# Patient Record
Sex: FEMALE | Race: WHITE | NOT HISPANIC OR LATINO | Employment: OTHER | URBAN - METROPOLITAN AREA
[De-identification: names, ages, dates, MRNs, and addresses within clinical notes are randomized per-mention and may not be internally consistent; named-entity substitution may affect disease eponyms.]

---

## 2020-12-14 ENCOUNTER — HOSPITAL ENCOUNTER (EMERGENCY)
Facility: HOSPITAL | Age: 70
Discharge: HOME | End: 2020-12-14
Attending: SURGERY
Payer: COMMERCIAL

## 2020-12-14 VITALS
OXYGEN SATURATION: 98 % | DIASTOLIC BLOOD PRESSURE: 78 MMHG | RESPIRATION RATE: 18 BRPM | SYSTOLIC BLOOD PRESSURE: 135 MMHG | HEART RATE: 82 BPM | WEIGHT: 200 LBS | BODY MASS INDEX: 33.32 KG/M2 | TEMPERATURE: 99 F | HEIGHT: 65 IN

## 2020-12-14 DIAGNOSIS — J18.9 PNEUMONIA DUE TO INFECTIOUS ORGANISM, UNSPECIFIED LATERALITY, UNSPECIFIED PART OF LUNG: Primary | ICD-10-CM

## 2020-12-14 DIAGNOSIS — R04.2 BLOOD IN SPUTUM: ICD-10-CM

## 2020-12-14 LAB
ALBUMIN SERPL BCP-MCNC: 2.8 G/DL (ref 3.5–5.2)
ALP SERPL-CCNC: 140 U/L (ref 55–135)
ALT SERPL W/O P-5'-P-CCNC: 24 U/L (ref 10–44)
ANION GAP SERPL CALC-SCNC: 7 MMOL/L (ref 8–16)
APTT BLDCRRT: 33.9 SEC (ref 21–32)
AST SERPL-CCNC: 19 U/L (ref 10–40)
BASOPHILS # BLD AUTO: 0.12 K/UL (ref 0–0.2)
BASOPHILS # BLD AUTO: 0.13 K/UL (ref 0–0.2)
BASOPHILS NFR BLD: 1.3 % (ref 0–1.9)
BASOPHILS NFR BLD: 1.3 % (ref 0–1.9)
BILIRUB SERPL-MCNC: 0.2 MG/DL (ref 0.1–1)
BNP SERPL-MCNC: <10 PG/ML (ref 0–99)
BUN SERPL-MCNC: 31 MG/DL (ref 8–23)
CALCIUM SERPL-MCNC: 9.4 MG/DL (ref 8.7–10.5)
CHLORIDE SERPL-SCNC: 102 MMOL/L (ref 95–110)
CK MB SERPL-MCNC: 3.6 NG/ML (ref 0.1–6.5)
CK MB SERPL-MCNC: 3.7 NG/ML (ref 0.1–6.5)
CK MB SERPL-RTO: 4.7 % (ref 0–5)
CK MB SERPL-RTO: 4.9 % (ref 0–5)
CK SERPL-CCNC: 73 U/L (ref 20–180)
CK SERPL-CCNC: 73 U/L (ref 20–180)
CK SERPL-CCNC: 79 U/L (ref 20–180)
CK SERPL-CCNC: 79 U/L (ref 20–180)
CO2 SERPL-SCNC: 27 MMOL/L (ref 23–29)
CREAT SERPL-MCNC: 1.2 MG/DL (ref 0.5–1.4)
DIFFERENTIAL METHOD: ABNORMAL
DIFFERENTIAL METHOD: ABNORMAL
EOSINOPHIL # BLD AUTO: 0.2 K/UL (ref 0–0.5)
EOSINOPHIL # BLD AUTO: 0.3 K/UL (ref 0–0.5)
EOSINOPHIL NFR BLD: 2.5 % (ref 0–8)
EOSINOPHIL NFR BLD: 2.6 % (ref 0–8)
ERYTHROCYTE [DISTWIDTH] IN BLOOD BY AUTOMATED COUNT: 17.6 % (ref 11.5–14.5)
ERYTHROCYTE [DISTWIDTH] IN BLOOD BY AUTOMATED COUNT: 17.7 % (ref 11.5–14.5)
EST. GFR  (AFRICAN AMERICAN): 53 ML/MIN/1.73 M^2
EST. GFR  (NON AFRICAN AMERICAN): 46 ML/MIN/1.73 M^2
GLUCOSE SERPL-MCNC: 345 MG/DL (ref 70–110)
HCT VFR BLD AUTO: 30.8 % (ref 37–48.5)
HCT VFR BLD AUTO: 31.3 % (ref 37–48.5)
HGB BLD-MCNC: 9.2 G/DL (ref 12–16)
HGB BLD-MCNC: 9.3 G/DL (ref 12–16)
IMM GRANULOCYTES # BLD AUTO: 0.06 K/UL (ref 0–0.04)
IMM GRANULOCYTES # BLD AUTO: 0.08 K/UL (ref 0–0.04)
IMM GRANULOCYTES NFR BLD AUTO: 0.6 % (ref 0–0.5)
IMM GRANULOCYTES NFR BLD AUTO: 0.8 % (ref 0–0.5)
INR PPP: 1.1 (ref 0.8–1.2)
LACTATE SERPL-SCNC: 0.7 MMOL/L (ref 0.5–2.2)
LACTATE SERPL-SCNC: 0.9 MMOL/L (ref 0.5–2.2)
LYMPHOCYTES # BLD AUTO: 1.3 K/UL (ref 1–4.8)
LYMPHOCYTES # BLD AUTO: 1.4 K/UL (ref 1–4.8)
LYMPHOCYTES NFR BLD: 13.8 % (ref 18–48)
LYMPHOCYTES NFR BLD: 13.8 % (ref 18–48)
MCH RBC QN AUTO: 23.9 PG (ref 27–31)
MCH RBC QN AUTO: 24 PG (ref 27–31)
MCHC RBC AUTO-ENTMCNC: 29.7 G/DL (ref 32–36)
MCHC RBC AUTO-ENTMCNC: 29.9 G/DL (ref 32–36)
MCV RBC AUTO: 80 FL (ref 82–98)
MCV RBC AUTO: 81 FL (ref 82–98)
MONOCYTES # BLD AUTO: 1 K/UL (ref 0.3–1)
MONOCYTES # BLD AUTO: 1 K/UL (ref 0.3–1)
MONOCYTES NFR BLD: 10.5 % (ref 4–15)
MONOCYTES NFR BLD: 9.9 % (ref 4–15)
NEUTROPHILS # BLD AUTO: 6.7 K/UL (ref 1.8–7.7)
NEUTROPHILS # BLD AUTO: 7 K/UL (ref 1.8–7.7)
NEUTROPHILS NFR BLD: 71.3 % (ref 38–73)
NEUTROPHILS NFR BLD: 71.6 % (ref 38–73)
NRBC BLD-RTO: 0 /100 WBC
NRBC BLD-RTO: 0 /100 WBC
PLATELET # BLD AUTO: 565 K/UL (ref 150–350)
PLATELET # BLD AUTO: 565 K/UL (ref 150–350)
PMV BLD AUTO: 9.5 FL (ref 9.2–12.9)
PMV BLD AUTO: 9.7 FL (ref 9.2–12.9)
POCT GLUCOSE: 280 MG/DL (ref 70–110)
POTASSIUM SERPL-SCNC: 4.5 MMOL/L (ref 3.5–5.1)
PROCALCITONIN SERPL IA-MCNC: 0.05 NG/ML
PROT SERPL-MCNC: 8.1 G/DL (ref 6–8.4)
PROTHROMBIN TIME: 11.4 SEC (ref 9–12.5)
RBC # BLD AUTO: 3.84 M/UL (ref 4–5.4)
RBC # BLD AUTO: 3.89 M/UL (ref 4–5.4)
SODIUM SERPL-SCNC: 136 MMOL/L (ref 136–145)
TROPONIN I SERPL DL<=0.01 NG/ML-MCNC: <0.006 NG/ML (ref 0–0.03)
TROPONIN I SERPL DL<=0.01 NG/ML-MCNC: <0.006 NG/ML (ref 0–0.03)
WBC # BLD AUTO: 9.45 K/UL (ref 3.9–12.7)
WBC # BLD AUTO: 9.75 K/UL (ref 3.9–12.7)

## 2020-12-14 PROCEDURE — 96372 THER/PROPH/DIAG INJ SC/IM: CPT

## 2020-12-14 PROCEDURE — 93005 ELECTROCARDIOGRAM TRACING: CPT

## 2020-12-14 PROCEDURE — 82962 GLUCOSE BLOOD TEST: CPT

## 2020-12-14 PROCEDURE — 83605 ASSAY OF LACTIC ACID: CPT | Mod: 91

## 2020-12-14 PROCEDURE — 63600175 PHARM REV CODE 636 W HCPCS: Performed by: SURGERY

## 2020-12-14 PROCEDURE — 93010 EKG 12-LEAD: ICD-10-PCS | Mod: ,,, | Performed by: INTERNAL MEDICINE

## 2020-12-14 PROCEDURE — 83880 ASSAY OF NATRIURETIC PEPTIDE: CPT

## 2020-12-14 PROCEDURE — 82553 CREATINE MB FRACTION: CPT | Mod: 91

## 2020-12-14 PROCEDURE — 80053 COMPREHEN METABOLIC PANEL: CPT

## 2020-12-14 PROCEDURE — 93010 ELECTROCARDIOGRAM REPORT: CPT | Mod: ,,, | Performed by: INTERNAL MEDICINE

## 2020-12-14 PROCEDURE — 85610 PROTHROMBIN TIME: CPT

## 2020-12-14 PROCEDURE — 85025 COMPLETE CBC W/AUTO DIFF WBC: CPT

## 2020-12-14 PROCEDURE — 82550 ASSAY OF CK (CPK): CPT

## 2020-12-14 PROCEDURE — 85730 THROMBOPLASTIN TIME PARTIAL: CPT

## 2020-12-14 PROCEDURE — 36415 COLL VENOUS BLD VENIPUNCTURE: CPT

## 2020-12-14 PROCEDURE — 84145 PROCALCITONIN (PCT): CPT

## 2020-12-14 PROCEDURE — 99285 EMERGENCY DEPT VISIT HI MDM: CPT | Mod: 25

## 2020-12-14 PROCEDURE — 87040 BLOOD CULTURE FOR BACTERIA: CPT

## 2020-12-14 PROCEDURE — 84484 ASSAY OF TROPONIN QUANT: CPT | Mod: 91

## 2020-12-14 RX ORDER — INSULIN ASPART 100 [IU]/ML
7 INJECTION, SOLUTION INTRAVENOUS; SUBCUTANEOUS
Status: COMPLETED | OUTPATIENT
Start: 2020-12-14 | End: 2020-12-14

## 2020-12-14 RX ORDER — BENZONATATE 100 MG/1
200 CAPSULE ORAL 3 TIMES DAILY PRN
Qty: 20 CAPSULE | Refills: 0 | Status: SHIPPED | OUTPATIENT
Start: 2020-12-14 | End: 2020-12-24

## 2020-12-14 RX ORDER — LEVOFLOXACIN 500 MG/1
500 TABLET, FILM COATED ORAL DAILY
Qty: 7 TABLET | Refills: 0 | Status: SHIPPED | OUTPATIENT
Start: 2020-12-14 | End: 2020-12-21

## 2020-12-14 RX ORDER — DOXYCYCLINE 100 MG/1
100 CAPSULE ORAL EVERY 12 HOURS
COMMUNITY

## 2020-12-14 RX ORDER — RISPERIDONE 2 MG/1
2 TABLET ORAL 2 TIMES DAILY
COMMUNITY

## 2020-12-14 RX ORDER — AMOXICILLIN AND CLAVULANATE POTASSIUM 875; 125 MG/1; MG/1
1 TABLET, FILM COATED ORAL 2 TIMES DAILY
COMMUNITY

## 2020-12-14 RX ADMIN — INSULIN ASPART 7 UNITS: 100 INJECTION, SOLUTION INTRAVENOUS; SUBCUTANEOUS at 11:12

## 2020-12-14 NOTE — ED TRIAGE NOTES
71 yo female presents to ED from wound care clinic after coughing up blood today.  Patient recently started on Eliquis for blood clots last week with hospital admission.  Patient reports feeling better after coughing.

## 2020-12-14 NOTE — ED PROVIDER NOTES
[facility]                                                 I reviewed the ER triage nurse's note before evaluating the patient    Chief Complaint  70 y.o. female with Cough       History of Present Illness  Latrice Haynes presents to the emergency room with cough this week  Patient has had a nonspecific cough this week, no fever or hypoxic  Patient went to wound care was recently was in [facility]  She states she was discharged on Eliquis, blood tinged sputum PTA  No active hemoptysis now, set wound care made her come to the ER   Patient on exam has minimal decreased breath sounds at bases  Patient has stable vital signs with no fever, no signs of COVID noted    The history is provided by the patient   device was not used during this ER visit  Medical history: Schizophrenia  History reviewed. No pertinent surgical history.   Review of patient's allergies indicates:  No Known Allergies     I have reviewed all of this patient's past medical, surgical, family, and social   histories as well as active allergies and medications documented in the  electronic medical record    Review of Systems and Physical Exam      Review of Systems  -- Constitution - no fever, denies fatigue, no weakness, no chills  -- Eyes - no tearing or redness, no visual disturbance  -- Ear, Nose - no tinnitus or earache, no nasal congestion or discharge  -- Mouth,Throat - no sore throat, no toothache, normal voice, normal swallowing  -- Respiratory - cough today with a slight a bit of hemoptysis  -- Cardiovascular - denies chest pain, no palpitations, denies claudication  -- Gastrointestinal - denies abdominal pain, nausea, vomiting, or diarrhea  -- Genitourinary - no dysuria, denies flank pain, no hematuria, no STD risk  -- Musculoskeletal - denies back pain, negative for trauma or injury  -- Neurological - no headache, denies weakness or seizure; no LOC  -- Skin - denies pallor, rash, or changes in  skin. no hives or welts noted    Vital Signs  Her temperature is 96 °F (35.6 °C).   Her blood pressure is 161/72 and her pulse is 89.   Her respiration is 20 and oxygen saturation is 99%.     Physical Exam  -- Nursing note and vitals reviewed  -- Constitutional: Appears well-developed and well-nourished  -- Head: Atraumatic. Normocephalic. No obvious abnormality  -- Eyes: Pupils are equal and reactive to light. Normal conjunctiva and lids  -- Nose: Nose normal in appearance, nares grossly normal. No discharge  -- Throat: Mucous membranes moist, pharynx normal, normal tonsils. No lesions   -- Ears: External ears and TM normal bilaterally. Normal hearing and no drainage  -- Neck: Normal range of motion. Neck supple. No masses, trachea midline  -- Cardiac: Normal rate, regular rhythm and normal heart sounds  -- Pulmonary:  Minimal rhonchi at the bilateral bases  -- Abdominal: Soft, no tenderness. Normal bowel sounds. Normal liver edge  -- Musculoskeletal: Normal range of motion, no effusions. Joints stable   -- Neurological: No focal deficits. Showed good interaction with staff  -- Vascular: Posterior tibial, dorsalis pedis and radial pulses 2+ bilaterally    -- Lymphatics/skin: venous stasis dermatitis with chronic lymphedema    Emergency Room Course      Lab Results     K 4.5      CO2 27   BUN 31 (H)   CREATININE 1.2    (H)   ALKPHOS 140 (H)   AST 19   ALT 24   BILITOT 0.2   ALBUMIN 2.8 (L)   PROT 8.1   WBC 9.45   HGB 9.2 (L)   HCT 30.8 (L)    (H)   CPK 73   CPK 73   CPKMB 3.6   TROPONINI <0.006   INR 1.1   BNP <10   LACTATE 0.9     EKG  -- The EKG findings today were without concerning findings from baseline  -- The troponin drawn in the ER today was within normal limits  -- The 2nd troponin drawn in the ER today was within normal limits       Radiology  -- chest x-ray shows a small possible pneumonia today    Medications Given  insulin aspart U-100 pen 7 Units (7 Units Subcutaneous Given  12/14/20 8742)     ED Physician Management  -- Diagnosis management comments: 70 y.o. female with cough today  -- patient has slight hemoptysis at home, told wound care during check-in  -- patient was recently discharged from [place], 1st wound care visit now  -- patient was not seen in wound care brought to the ER for hemoptysis  -- patient has no active blood in sputum at this time, has had a cough this week  -- no wheezing, no sputum, chest x-ray shows a slight pneumonia in the ER  -- patient is currently on Eliquis after being discharged from [facility] last week  -- patient carefully counseled to monitor any blood sputum, voiced understanding  -- will start Levaquin for pneumonia, no hypoxemia, no fever, normal white count  -- patient carefully counseled to follow-up with PCP regarding this pneumonia  -- patient also carefully counseled to return to the ER with any concerns after DC    Diagnosis  [J18.9] Pneumonia  [R04.2] Blood in sputum    Disposition and Plan  -- Disposition: home  -- Condition: stable  -- Follow-up: Patient to follow up with MD in 1-2 days.  -- I advised the patient that we have found no life threatening condition today  -- At this time, I believe the patient is clinically stable for discharge.   -- The patient acknowledges that close follow up with a MD is required   -- Patient agrees to comply with all instruction and direction given in the ER    This note is dictated on M*Modal word recognition program.  There are word recognition mistakes that are occasionally missed on review.         Bear Arnold MD  12/14/20 7446

## 2020-12-19 LAB
BACTERIA BLD CULT: NORMAL
BACTERIA BLD CULT: NORMAL

## 2023-04-10 ENCOUNTER — OFFICE VISIT (OUTPATIENT)
Dept: WOUND CARE | Facility: HOSPITAL | Age: 73
End: 2023-04-10
Attending: SURGERY
Payer: COMMERCIAL

## 2023-04-10 VITALS
SYSTOLIC BLOOD PRESSURE: 137 MMHG | TEMPERATURE: 98 F | RESPIRATION RATE: 18 BRPM | DIASTOLIC BLOOD PRESSURE: 77 MMHG | HEART RATE: 73 BPM

## 2023-04-10 DIAGNOSIS — E11.621 DIABETIC ULCER OF TOE OF RIGHT FOOT ASSOCIATED WITH TYPE 2 DIABETES MELLITUS, WITH FAT LAYER EXPOSED: ICD-10-CM

## 2023-04-10 DIAGNOSIS — L97.512 DIABETIC ULCER OF TOE OF RIGHT FOOT ASSOCIATED WITH TYPE 2 DIABETES MELLITUS, WITH FAT LAYER EXPOSED: ICD-10-CM

## 2023-04-10 DIAGNOSIS — I89.0 LYMPHEDEMA DUE TO CHRONIC INFLAMMATION: ICD-10-CM

## 2023-04-10 DIAGNOSIS — I87.2 CHRONIC VENOUS STASIS DERMATITIS OF BOTH LOWER EXTREMITIES: ICD-10-CM

## 2023-04-10 PROCEDURE — 99213 OFFICE O/P EST LOW 20 MIN: CPT | Mod: 25

## 2023-04-10 PROCEDURE — 99499 UNLISTED E&M SERVICE: CPT | Mod: ,,, | Performed by: SURGERY

## 2023-04-10 PROCEDURE — 3078F DIAST BP <80 MM HG: CPT | Mod: CPTII,,, | Performed by: SURGERY

## 2023-04-10 PROCEDURE — 3078F PR MOST RECENT DIASTOLIC BLOOD PRESSURE < 80 MM HG: ICD-10-PCS | Mod: CPTII,,, | Performed by: SURGERY

## 2023-04-10 PROCEDURE — 11042 DBRDMT SUBQ TIS 1ST 20SQCM/<: CPT

## 2023-04-10 PROCEDURE — 99499 NO LOS: ICD-10-PCS | Mod: ,,, | Performed by: SURGERY

## 2023-04-10 PROCEDURE — 29580 STRAPPING UNNA BOOT: CPT | Mod: LT

## 2023-04-10 PROCEDURE — 3075F PR MOST RECENT SYSTOLIC BLOOD PRESS GE 130-139MM HG: ICD-10-PCS | Mod: CPTII,,, | Performed by: SURGERY

## 2023-04-10 PROCEDURE — 3075F SYST BP GE 130 - 139MM HG: CPT | Mod: CPTII,,, | Performed by: SURGERY

## 2023-04-10 PROCEDURE — 29581 APPL MULTLAYER CMPRN SYS LEG: CPT | Mod: 50

## 2023-04-10 RX ORDER — GLIPIZIDE 5 MG/1
TABLET ORAL
COMMUNITY
Start: 2023-03-22

## 2023-04-10 RX ORDER — FERROUS SULFATE 325(65) MG
325 TABLET ORAL
COMMUNITY

## 2023-04-10 RX ORDER — METOPROLOL TARTRATE 25 MG/1
12.5 TABLET, FILM COATED ORAL EVERY 12 HOURS
COMMUNITY
Start: 2023-03-22

## 2023-04-10 RX ORDER — METFORMIN HYDROCHLORIDE 1000 MG/1
TABLET ORAL
COMMUNITY
Start: 2023-03-26

## 2023-04-10 RX ORDER — FUROSEMIDE 40 MG/1
40 TABLET ORAL
COMMUNITY
Start: 2023-03-15

## 2023-04-10 NOTE — ASSESSMENT & PLAN NOTE
Edema is fairly well controlled with to wear compression.  We will continue to wear compression.  Will evaluate for juxta Lite stockings and lymphedema pumps.  The importance of edema control and elevation with stressed to the patient and the son.    Wound is fairly clean with minimal slough.  Will need intermittent sharp debridement.  Will implement silver dressing.  The importance of glucose control also discussed with patient and son.

## 2023-04-10 NOTE — PROGRESS NOTES
[facility]  Wound Care  History and Physical    Problem List Items Addressed This Visit       Diabetic ulcer of toe of right foot associated with type 2 diabetes mellitus, with fat layer exposed    Overview     72-year-old diabetic referred to the Wound Center a right 3rd toe wound.  Patient with long history of chronic venous disease and lower extremity swelling.  Patient had uncontrolled swelling for a long period of time with multiple lower extremity wounds.  Patient's son now managing the edema with 2 layer compression.  He states there has been marked improvement over the past several months since he has been managing the wraps.  Patient has persistent wound on the right 3rd toe.  There has been no significant drainage.  No erythema reported.  Last hemoglobin A1c 6.8.  Patient's son says he had looked into lower extremity evaluation for venous reflux and schedule an appointment with [facility] but patient is refusing further evaluation at this time.           Current Assessment & Plan     Edema is fairly well controlled with to wear compression.  We will continue to wear compression.  Will evaluate for juxta Lite stockings and lymphedema pumps.  The importance of edema control and elevation with stressed to the patient and the son.    Wound is fairly clean with minimal slough.  Will need intermittent sharp debridement.  Will implement silver dressing.  The importance of glucose control also discussed with patient and son.           Chronic venous stasis dermatitis of both lower extremities    Lymphedema due to chronic inflammation         History:    Past Medical History:   Diagnosis Date    Schizophrenia    Hypertension diabetes chronic kidney disease    No past surgical history on file.    No family history on file.     reports that she has never smoked. She has never used smokeless tobacco. She reports that she does not drink alcohol and does not use drugs.    has a current medication  list which includes the following prescription(s): amoxicillin-clavulanate 875-125mg, apixaban, doxycycline, lactobacillus rhamnosus gg, and risperidone.    Allergies:  Patient has no known allergies.    Review of Systems:  ROS      There were no vitals filed for this visit.      BMI:  There is no height or weight on file to calculate BMI.    Physical Exam:  Physical Exam  Patient chronically ill.    Patient obese   Bilateral lower extremity edema and changes consistent with chronic stasis dermatitis.  Swelling appears improved compared to description given by son.  Patient has open wound right 3rd toe.  Wound is fairly clean granulating with minimal slough.  No sign of infection.    No palpable pedal pulses due to swelling.  ABIs normal.  A1C:  No results for input(s): HGBA1C in the last 2160 hours.  BMP:  No results for input(s): GLU, NA, K, CL, CO2, BUN, CREATININE, CALCIUM, MG in the last 2160 hours.   CBC:  No results for input(s): WBC, RBC, HGB, HCT, PLT, MCV, MCH, MCHC in the last 2160 hours.  CMP:  No results for input(s): GLU, CALCIUM, ALBUMIN, PROT, NA, K, CO2, CL, BUN, ALKPHOS, ALT, AST, BILITOT in the last 2160 hours.    Invalid input(s): CREATININ  PREALBUMIN:  No results for input(s): PREALBUMIN in the last 2160 hours.  WOUND CULTURES:  No results for input(s): LABAERO in the last 2160 hours.        Plan:  See Wound Docs note for plan and follow up.  Edema much better controlled since son has been doing  2 layer compression.  Will continue current compression.  Will evaluate for juxta Lite stockings and lymphedema pumps.  Importance of elevation edema control stressed to the patient and son.  Also offered evaluation by ultrasound with [facility].  Patient refusing at this time.  The importance of glucose control also stressed to the patient.  Wound will need debridement and will implement silver dressing.      Alcides BURRELL Kramer  [facility]

## 2023-04-10 NOTE — ASSESSMENT & PLAN NOTE
Continue layered compression therapy.  Will evaluate for juxta Lite stockings and lymphedema pumps.  Further evaluation with ultrasound offered but patient refusing at this time

## 2023-04-17 ENCOUNTER — OFFICE VISIT (OUTPATIENT)
Dept: WOUND CARE | Facility: HOSPITAL | Age: 73
End: 2023-04-17
Attending: SURGERY
Payer: COMMERCIAL

## 2023-04-17 VITALS
SYSTOLIC BLOOD PRESSURE: 131 MMHG | RESPIRATION RATE: 20 BRPM | DIASTOLIC BLOOD PRESSURE: 78 MMHG | TEMPERATURE: 98 F | HEART RATE: 76 BPM

## 2023-04-17 DIAGNOSIS — E11.621 DIABETIC ULCER OF TOE OF RIGHT FOOT ASSOCIATED WITH TYPE 2 DIABETES MELLITUS, WITH FAT LAYER EXPOSED: ICD-10-CM

## 2023-04-17 DIAGNOSIS — I89.0 LYMPHEDEMA DUE TO CHRONIC INFLAMMATION: ICD-10-CM

## 2023-04-17 DIAGNOSIS — I87.2 CHRONIC VENOUS STASIS DERMATITIS OF BOTH LOWER EXTREMITIES: Primary | ICD-10-CM

## 2023-04-17 DIAGNOSIS — L97.512 RIGHT FOOT ULCER, WITH FAT LAYER EXPOSED: ICD-10-CM

## 2023-04-17 DIAGNOSIS — L97.512 DIABETIC ULCER OF TOE OF RIGHT FOOT ASSOCIATED WITH TYPE 2 DIABETES MELLITUS, WITH FAT LAYER EXPOSED: ICD-10-CM

## 2023-04-17 PROCEDURE — 3078F DIAST BP <80 MM HG: CPT | Mod: CPTII,,, | Performed by: SURGERY

## 2023-04-17 PROCEDURE — 3075F SYST BP GE 130 - 139MM HG: CPT | Mod: CPTII,,, | Performed by: SURGERY

## 2023-04-17 PROCEDURE — 3075F PR MOST RECENT SYSTOLIC BLOOD PRESS GE 130-139MM HG: ICD-10-PCS | Mod: CPTII,,, | Performed by: SURGERY

## 2023-04-17 PROCEDURE — 11042 DBRDMT SUBQ TIS 1ST 20SQCM/<: CPT

## 2023-04-17 PROCEDURE — 99499 UNLISTED E&M SERVICE: CPT | Mod: ,,, | Performed by: SURGERY

## 2023-04-17 PROCEDURE — 99499 NO LOS: ICD-10-PCS | Mod: ,,, | Performed by: SURGERY

## 2023-04-17 PROCEDURE — 3078F PR MOST RECENT DIASTOLIC BLOOD PRESSURE < 80 MM HG: ICD-10-PCS | Mod: CPTII,,, | Performed by: SURGERY

## 2023-04-17 PROCEDURE — 29581 APPL MULTLAYER CMPRN SYS LEG: CPT

## 2023-04-17 NOTE — ASSESSMENT & PLAN NOTE
Edema fairly well controlled.  Juxta Lite stockings have been ordered.  Wound remains clean and granulating.  Continue debridement is needed.  Continue silver dressing.  Continue compression therapy

## 2023-04-17 NOTE — PROGRESS NOTES
[facility]  Wound Care  Progress Note    Problem List Items Addressed This Visit       Diabetic ulcer of toe of right foot associated with type 2 diabetes mellitus, with fat layer exposed    Overview     72-year-old diabetic referred to the Wound Center a right 3rd toe wound.  Patient with long history of chronic venous disease and lower extremity swelling.  Patient had uncontrolled swelling for a long period of time with multiple lower extremity wounds.  Patient's son now managing the edema with 2 layer compression.  He states there has been marked improvement over the past several months since he has been managing the wraps.  Patient has persistent wound on the right 3rd toe.  There has been no significant drainage.  No erythema reported.  Last hemoglobin A1c 6.8.  Patient's son says he had looked into lower extremity evaluation for venous reflux and schedule an appointment with [facility] but patient is refusing further evaluation at this time.           Current Assessment & Plan     Edema fairly well controlled.  Juxta Lite stockings have been ordered.  Wound remains clean and granulating.  Continue debridement is needed.  Continue silver dressing.  Continue compression therapy              See wound doc progress notes. Documents will be scanned.        Alcides Kramer  [facility]

## 2023-05-01 ENCOUNTER — OFFICE VISIT (OUTPATIENT)
Dept: WOUND CARE | Facility: HOSPITAL | Age: 73
End: 2023-05-01
Attending: SURGERY
Payer: COMMERCIAL

## 2023-05-01 VITALS
RESPIRATION RATE: 19 BRPM | HEART RATE: 75 BPM | DIASTOLIC BLOOD PRESSURE: 78 MMHG | TEMPERATURE: 98 F | SYSTOLIC BLOOD PRESSURE: 128 MMHG

## 2023-05-01 DIAGNOSIS — L97.512 DIABETIC ULCER OF TOE OF RIGHT FOOT ASSOCIATED WITH TYPE 2 DIABETES MELLITUS, WITH FAT LAYER EXPOSED: Primary | ICD-10-CM

## 2023-05-01 DIAGNOSIS — I87.2 CHRONIC VENOUS STASIS DERMATITIS OF BOTH LOWER EXTREMITIES: ICD-10-CM

## 2023-05-01 DIAGNOSIS — L97.512 RIGHT FOOT ULCER, WITH FAT LAYER EXPOSED: ICD-10-CM

## 2023-05-01 DIAGNOSIS — E11.621 DIABETIC ULCER OF TOE OF RIGHT FOOT ASSOCIATED WITH TYPE 2 DIABETES MELLITUS, WITH FAT LAYER EXPOSED: Primary | ICD-10-CM

## 2023-05-01 DIAGNOSIS — I87.333 CHRONIC VENOUS HYPERTENSION WITH ULCER AND INFLAMMATION INVOLVING BOTH SIDES: ICD-10-CM

## 2023-05-01 DIAGNOSIS — I89.0 LYMPHEDEMA DUE TO CHRONIC INFLAMMATION: ICD-10-CM

## 2023-05-01 PROCEDURE — 3078F PR MOST RECENT DIASTOLIC BLOOD PRESSURE < 80 MM HG: ICD-10-PCS | Mod: CPTII,,, | Performed by: SURGERY

## 2023-05-01 PROCEDURE — 11042 DBRDMT SUBQ TIS 1ST 20SQCM/<: CPT

## 2023-05-01 PROCEDURE — 99499 NO LOS: ICD-10-PCS | Mod: ,,, | Performed by: SURGERY

## 2023-05-01 PROCEDURE — 3078F DIAST BP <80 MM HG: CPT | Mod: CPTII,,, | Performed by: SURGERY

## 2023-05-01 PROCEDURE — 99499 UNLISTED E&M SERVICE: CPT | Mod: ,,, | Performed by: SURGERY

## 2023-05-01 PROCEDURE — 3074F PR MOST RECENT SYSTOLIC BLOOD PRESSURE < 130 MM HG: ICD-10-PCS | Mod: CPTII,,, | Performed by: SURGERY

## 2023-05-01 PROCEDURE — 3074F SYST BP LT 130 MM HG: CPT | Mod: CPTII,,, | Performed by: SURGERY

## 2023-05-01 PROCEDURE — 29581 APPL MULTLAYER CMPRN SYS LEG: CPT

## 2023-05-01 NOTE — ASSESSMENT & PLAN NOTE
Patient has developed some blistering above her wraps.  She continues with mild to modest drainage that is clear.    She has significant scaling of skin.  This is associated with dermatitis.    Continue 2 layer compression.    Begin betamethasone.  Long-term goal would be compression stockings.  Patient instructed on leg elevation and walking to assist with edema control.

## 2023-05-01 NOTE — ASSESSMENT & PLAN NOTE
Wound is improving.    Excisional debridement performed maintain active phase wound healing and remove nonviable tissue.    Continue silver alginate

## 2023-05-01 NOTE — PROGRESS NOTES
[facility]  Wound Care  Progress Note    Problem List Items Addressed This Visit          Cardiac/Vascular    Chronic venous stasis dermatitis of both lower extremities       Endocrine    Diabetic ulcer of toe of right foot associated with type 2 diabetes mellitus, with fat layer exposed - Primary    Overview     72-year-old diabetic referred to the Wound Center a right 3rd toe wound.  Patient with long history of chronic venous disease and lower extremity swelling.  Patient had uncontrolled swelling for a long period of time with multiple lower extremity wounds.  Patient's son now managing the edema with 2 layer compression.  He states there has been marked improvement over the past several months since he has been managing the wraps.  Patient has persistent wound on the right 3rd toe.  There has been no significant drainage.  No erythema reported.  Last hemoglobin A1c 6.8.  Patient's son says he had looked into lower extremity evaluation for venous reflux and schedule an appointment with [facility] but patient is refusing further evaluation at this time.           Current Assessment & Plan     Wound is improving.    Excisional debridement performed maintain active phase wound healing and remove nonviable tissue.    Continue silver alginate              Orthopedic    Chronic venous hypertension with ulcer and inflammation involving both sides    Overview     72-year-old diabetic referred to the Wound Center a right 3rd toe wound.  Patient with long history of chronic venous disease and lower extremity swelling.  Patient had uncontrolled swelling for a long period of time with multiple lower extremity wounds.  Patient's son now managing the edema with 2 layer compression.  He states there has been marked improvement over the past several months since he has been managing the wraps. Patient's son says he had looked into lower extremity evaluation for venous reflux and schedule an appointment with [facility] but  patient is refusing further evaluation at this time.         Current Assessment & Plan     Patient has developed some blistering above her wraps.  She continues with mild to modest drainage that is clear.    She has significant scaling of skin.  This is associated with dermatitis.    Continue 2 layer compression.    Begin betamethasone.  Long-term goal would be compression stockings.  Patient instructed on leg elevation and walking to assist with edema control.              Other    Lymphedema due to chronic inflammation     Other Visit Diagnoses       Right foot ulcer, with fat layer exposed                See wound doc progress notes. Documents will be scanned.        Kevin Strong  [facility]

## 2023-05-08 ENCOUNTER — OFFICE VISIT (OUTPATIENT)
Dept: WOUND CARE | Facility: HOSPITAL | Age: 73
End: 2023-05-08
Attending: SURGERY
Payer: COMMERCIAL

## 2023-05-08 VITALS
RESPIRATION RATE: 18 BRPM | HEART RATE: 72 BPM | SYSTOLIC BLOOD PRESSURE: 122 MMHG | TEMPERATURE: 98 F | DIASTOLIC BLOOD PRESSURE: 69 MMHG

## 2023-05-08 DIAGNOSIS — L97.512 RIGHT FOOT ULCER, WITH FAT LAYER EXPOSED: ICD-10-CM

## 2023-05-08 DIAGNOSIS — I87.2 CHRONIC VENOUS STASIS DERMATITIS OF BOTH LOWER EXTREMITIES: Primary | ICD-10-CM

## 2023-05-08 DIAGNOSIS — E11.621 DIABETIC ULCER OF TOE OF RIGHT FOOT ASSOCIATED WITH TYPE 2 DIABETES MELLITUS, WITH FAT LAYER EXPOSED: ICD-10-CM

## 2023-05-08 DIAGNOSIS — L97.512 DIABETIC ULCER OF TOE OF RIGHT FOOT ASSOCIATED WITH TYPE 2 DIABETES MELLITUS, WITH FAT LAYER EXPOSED: ICD-10-CM

## 2023-05-08 PROCEDURE — 29581 APPL MULTLAYER CMPRN SYS LEG: CPT

## 2023-05-08 PROCEDURE — 3078F DIAST BP <80 MM HG: CPT | Mod: CPTII,,, | Performed by: SURGERY

## 2023-05-08 PROCEDURE — 99499 NO LOS: ICD-10-PCS | Mod: ,,, | Performed by: SURGERY

## 2023-05-08 PROCEDURE — 3074F SYST BP LT 130 MM HG: CPT | Mod: CPTII,,, | Performed by: SURGERY

## 2023-05-08 PROCEDURE — 11042 DBRDMT SUBQ TIS 1ST 20SQCM/<: CPT

## 2023-05-08 PROCEDURE — 3078F PR MOST RECENT DIASTOLIC BLOOD PRESSURE < 80 MM HG: ICD-10-PCS | Mod: CPTII,,, | Performed by: SURGERY

## 2023-05-08 PROCEDURE — 3074F PR MOST RECENT SYSTOLIC BLOOD PRESSURE < 130 MM HG: ICD-10-PCS | Mod: CPTII,,, | Performed by: SURGERY

## 2023-05-08 PROCEDURE — 99499 UNLISTED E&M SERVICE: CPT | Mod: ,,, | Performed by: SURGERY

## 2023-05-08 NOTE — ASSESSMENT & PLAN NOTE
Edema much better controlled with layered compression.  Wound bed remains clean and granulating.  No sign of infection.  No exposed bone.  Continued sharp debridement is needed.  Continue silver dressing.  Will need stockings for long-term edema control.  Juxta Lite stockings have been ordered

## 2023-05-08 NOTE — PROGRESS NOTES
[facility]  Wound Care  Progress Note    Problem List Items Addressed This Visit       Diabetic ulcer of toe of right foot associated with type 2 diabetes mellitus, with fat layer exposed    Overview     72-year-old diabetic referred to the Wound Center a right 3rd toe wound.  Patient with long history of chronic venous disease and lower extremity swelling.  Patient had uncontrolled swelling for a long period of time with multiple lower extremity wounds.  Patient's son now managing the edema with 2 layer compression.  He states there has been marked improvement over the past several months since he has been managing the wraps.  Patient has persistent wound on the right 3rd toe.  There has been no significant drainage.  No erythema reported.  Last hemoglobin A1c 6.8.  Patient's son says he had looked into lower extremity evaluation for venous reflux and schedule an appointment with [facility] but patient is refusing further evaluation at this time.           Current Assessment & Plan     Edema much better controlled with layered compression.  Wound bed remains clean and granulating.  No sign of infection.  No exposed bone.  Continued sharp debridement is needed.  Continue silver dressing.  Will need stockings for long-term edema control.  Juxta Lite stockings have been ordered              See wound doc progress notes. Documents will be scanned.        Alcides Kramer  [facility]

## 2023-05-15 ENCOUNTER — OFFICE VISIT (OUTPATIENT)
Dept: WOUND CARE | Facility: HOSPITAL | Age: 73
End: 2023-05-15
Attending: SURGERY
Payer: COMMERCIAL

## 2023-05-15 VITALS
HEART RATE: 70 BPM | DIASTOLIC BLOOD PRESSURE: 68 MMHG | RESPIRATION RATE: 19 BRPM | TEMPERATURE: 99 F | SYSTOLIC BLOOD PRESSURE: 125 MMHG

## 2023-05-15 DIAGNOSIS — I87.2 CHRONIC VENOUS STASIS DERMATITIS OF BOTH LOWER EXTREMITIES: Primary | ICD-10-CM

## 2023-05-15 DIAGNOSIS — L97.512 DIABETIC ULCER OF TOE OF RIGHT FOOT ASSOCIATED WITH TYPE 2 DIABETES MELLITUS, WITH FAT LAYER EXPOSED: ICD-10-CM

## 2023-05-15 DIAGNOSIS — L89.893 DECUBITUS ULCER OF RIGHT LEG, STAGE 3: ICD-10-CM

## 2023-05-15 DIAGNOSIS — L97.512 RIGHT FOOT ULCER, WITH FAT LAYER EXPOSED: ICD-10-CM

## 2023-05-15 DIAGNOSIS — E11.621 DIABETIC ULCER OF TOE OF RIGHT FOOT ASSOCIATED WITH TYPE 2 DIABETES MELLITUS, WITH FAT LAYER EXPOSED: ICD-10-CM

## 2023-05-15 DIAGNOSIS — I89.0 LYMPHEDEMA DUE TO CHRONIC INFLAMMATION: ICD-10-CM

## 2023-05-15 DIAGNOSIS — L89.213 PRESSURE ULCER OF UPPER THIGH, RIGHT, STAGE III: ICD-10-CM

## 2023-05-15 DIAGNOSIS — I87.333 CHRONIC VENOUS HYPERTENSION WITH ULCER AND INFLAMMATION INVOLVING BOTH SIDES: ICD-10-CM

## 2023-05-15 PROCEDURE — 11042 DBRDMT SUBQ TIS 1ST 20SQCM/<: CPT

## 2023-05-15 PROCEDURE — 29581 APPL MULTLAYER CMPRN SYS LEG: CPT

## 2023-05-15 PROCEDURE — 99499 NO LOS: ICD-10-PCS | Mod: ,,, | Performed by: SURGERY

## 2023-05-15 PROCEDURE — 99499 UNLISTED E&M SERVICE: CPT | Mod: ,,, | Performed by: SURGERY

## 2023-05-15 NOTE — ASSESSMENT & PLAN NOTE
Wound bed mostly slough.  No sign of soft tissue infection.  Wound will need sharp debridement.  Implement Mesalt.  Will try to offload.

## 2023-05-15 NOTE — PROGRESS NOTES
[facility]  Wound Care  Progress Note    Problem List Items Addressed This Visit       Diabetic ulcer of toe of right foot associated with type 2 diabetes mellitus, with fat layer exposed    Overview     72-year-old diabetic referred to the Wound Center a right 3rd toe wound.  Patient with long history of chronic venous disease and lower extremity swelling.  Patient had uncontrolled swelling for a long period of time with multiple lower extremity wounds.  Patient's son now managing the edema with 2 layer compression.  He states there has been marked improvement over the past several months since he has been managing the wraps.  Patient has persistent wound on the right 3rd toe.  There has been no significant drainage.  No erythema reported.  Last hemoglobin A1c 6.8.  Patient's son says he had looked into lower extremity evaluation for venous reflux and schedule an appointment with [facility] but patient is refusing further evaluation at this time.           Current Assessment & Plan     Edema fairly well controlled with multilayer compression.  Little change of the 3rd toe wound.  No erythema or sign of soft tissue infection.  Continue sharp debridement is needed.  Continue silver dressing.  Continue multilayer compression.           Pressure ulcer of upper thigh, right, stage III    Overview     Patient informed us today of a chronic wound on the right posterior thigh.  Patient states that it has been present for several months.  She is had no real wound care to this area.  Patient says that wound occurred after sitting in a hard metal chair.  Patient denies any significant pain.  Patient denies any fever chills or significant drainage.           Current Assessment & Plan     Wound bed mostly slough.  No sign of soft tissue infection.  Wound will need sharp debridement.  Implement Mesalt.  Will try to offload.              See wound doc progress notes. Documents will be scanned.        Alcides BURRELL  [facility]

## 2023-05-15 NOTE — ASSESSMENT & PLAN NOTE
Edema fairly well controlled with multilayer compression.  Little change of the 3rd toe wound.  No erythema or sign of soft tissue infection.  Continue sharp debridement is needed.  Continue silver dressing.  Continue multilayer compression.

## 2023-06-05 ENCOUNTER — OFFICE VISIT (OUTPATIENT)
Dept: WOUND CARE | Facility: HOSPITAL | Age: 73
End: 2023-06-05
Attending: SURGERY
Payer: COMMERCIAL

## 2023-06-05 VITALS
HEART RATE: 71 BPM | TEMPERATURE: 98 F | RESPIRATION RATE: 18 BRPM | SYSTOLIC BLOOD PRESSURE: 128 MMHG | DIASTOLIC BLOOD PRESSURE: 62 MMHG

## 2023-06-05 DIAGNOSIS — L89.213 PRESSURE ULCER OF UPPER THIGH, RIGHT, STAGE III: ICD-10-CM

## 2023-06-05 DIAGNOSIS — L97.512 RIGHT FOOT ULCER, WITH FAT LAYER EXPOSED: Primary | ICD-10-CM

## 2023-06-05 DIAGNOSIS — E11.621 DIABETIC ULCER OF TOE OF RIGHT FOOT ASSOCIATED WITH TYPE 2 DIABETES MELLITUS, WITH FAT LAYER EXPOSED: ICD-10-CM

## 2023-06-05 DIAGNOSIS — L97.512 DIABETIC ULCER OF TOE OF RIGHT FOOT ASSOCIATED WITH TYPE 2 DIABETES MELLITUS, WITH FAT LAYER EXPOSED: ICD-10-CM

## 2023-06-05 DIAGNOSIS — I87.333 CHRONIC VENOUS HYPERTENSION WITH ULCER AND INFLAMMATION INVOLVING BOTH SIDES: ICD-10-CM

## 2023-06-05 PROCEDURE — 29581 APPL MULTLAYER CMPRN SYS LEG: CPT

## 2023-06-05 PROCEDURE — 11042 DBRDMT SUBQ TIS 1ST 20SQCM/<: CPT

## 2023-06-05 PROCEDURE — 99499 NO LOS: ICD-10-PCS | Mod: ,,, | Performed by: SURGERY

## 2023-06-05 PROCEDURE — 99499 UNLISTED E&M SERVICE: CPT | Mod: ,,, | Performed by: SURGERY

## 2023-06-05 NOTE — ASSESSMENT & PLAN NOTE
Patient's legs overall are improved.    There is less scaling and less inflammation.    Continue compression therapy.    Leg elevation.    The patient's son is considering purchasing a lift chair for leg elevation and ease of standing.

## 2023-06-05 NOTE — ASSESSMENT & PLAN NOTE
Wound is improved.    Wound is pink with little slough.    Excisional debridement performed to remove nonviable slough and maintain active phase wound healing.    Begin silver alginate.

## 2023-06-05 NOTE — PROGRESS NOTES
[facility]  Wound Care  Progress Note    Problem List Items Addressed This Visit          Endocrine    Diabetic ulcer of toe of right foot associated with type 2 diabetes mellitus, with fat layer exposed    Overview     72-year-old diabetic referred to the Wound Center a right 3rd toe wound.  Patient with long history of chronic venous disease and lower extremity swelling.  Patient had uncontrolled swelling for a long period of time with multiple lower extremity wounds.  Patient's son now managing the edema with 2 layer compression.  He states there has been marked improvement over the past several months since he has been managing the wraps.  Patient has persistent wound on the right 3rd toe.  There has been no significant drainage.  No erythema reported.  Last hemoglobin A1c 6.8.  Patient's son says he had looked into lower extremity evaluation for venous reflux and schedule an appointment with [facility] but patient is refusing further evaluation at this time.         Current Assessment & Plan     Wound is slightly improved.    There is fragile granulation tissue present.  The wound bleeds easily.    Some nonviable slough was removed with a curette into the subcutaneous tissue.    Continue silver alginate.            Orthopedic    Chronic venous hypertension with ulcer and inflammation involving both sides    Overview     72-year-old diabetic referred to the Wound Center a right 3rd toe wound.  Patient with long history of chronic venous disease and lower extremity swelling.  Patient had uncontrolled swelling for a long period of time with multiple lower extremity wounds.  Patient's son now managing the edema with 2 layer compression.  He states there has been marked improvement over the past several months since he has been managing the wraps. Patient's son says he had looked into lower extremity evaluation for venous reflux and schedule an appointment with [facility] but patient is refusing further  evaluation at this time.         Current Assessment & Plan     Patient's legs overall are improved.    There is less scaling and less inflammation.    Continue compression therapy.    Leg elevation.    The patient's son is considering purchasing a lift chair for leg elevation and ease of standing.         Pressure ulcer of upper thigh, right, stage III    Overview     Patient informed us today of a chronic wound on the right posterior thigh.  Patient states that it has been present for several months.  She is had no real wound care to this area.  Patient says that wound occurred after sitting in a hard metal chair.  Patient denies any significant pain.  Patient denies any fever chills or significant drainage.         Current Assessment & Plan     Wound is improved.    Wound is pink with little slough.    Excisional debridement performed to remove nonviable slough and maintain active phase wound healing.    Begin silver alginate.            See wound doc progress notes. Documents will be scanned.        Kevin Strong  [facility]

## 2023-06-12 ENCOUNTER — OFFICE VISIT (OUTPATIENT)
Dept: WOUND CARE | Facility: HOSPITAL | Age: 73
End: 2023-06-12
Attending: SURGERY
Payer: COMMERCIAL

## 2023-06-12 VITALS
DIASTOLIC BLOOD PRESSURE: 66 MMHG | TEMPERATURE: 99 F | SYSTOLIC BLOOD PRESSURE: 123 MMHG | RESPIRATION RATE: 19 BRPM | HEART RATE: 75 BPM

## 2023-06-12 DIAGNOSIS — L97.512 DIABETIC ULCER OF TOE OF RIGHT FOOT ASSOCIATED WITH TYPE 2 DIABETES MELLITUS, WITH FAT LAYER EXPOSED: ICD-10-CM

## 2023-06-12 DIAGNOSIS — I87.333 CHRONIC VENOUS HYPERTENSION WITH ULCER AND INFLAMMATION INVOLVING BOTH SIDES: ICD-10-CM

## 2023-06-12 DIAGNOSIS — E11.621 DIABETIC ULCER OF TOE OF RIGHT FOOT ASSOCIATED WITH TYPE 2 DIABETES MELLITUS, WITH FAT LAYER EXPOSED: ICD-10-CM

## 2023-06-12 DIAGNOSIS — L89.213 PRESSURE ULCER OF UPPER THIGH, RIGHT, STAGE III: ICD-10-CM

## 2023-06-12 PROCEDURE — 11042 DBRDMT SUBQ TIS 1ST 20SQCM/<: CPT

## 2023-06-12 PROCEDURE — 99499 NO LOS: ICD-10-PCS | Mod: ,,, | Performed by: SURGERY

## 2023-06-12 PROCEDURE — 99499 UNLISTED E&M SERVICE: CPT | Mod: ,,, | Performed by: SURGERY

## 2023-06-12 PROCEDURE — 29581 APPL MULTLAYER CMPRN SYS LEG: CPT

## 2023-06-12 NOTE — PROGRESS NOTES
[facility]  Wound Care  Progress Note    Problem List Items Addressed This Visit          Endocrine    Diabetic ulcer of toe of right foot associated with type 2 diabetes mellitus, with fat layer exposed    Overview     72-year-old diabetic referred to the Wound Center a right 3rd toe wound.  Patient with long history of chronic venous disease and lower extremity swelling.  Patient had uncontrolled swelling for a long period of time with multiple lower extremity wounds.  Patient's son now managing the edema with 2 layer compression.  He states there has been marked improvement over the past several months since he has been managing the wraps.  Patient has persistent wound on the right 3rd toe.  There has been no significant drainage.  No erythema reported.  Last hemoglobin A1c 6.8.  Patient's son says he had looked into lower extremity evaluation for venous reflux and schedule an appointment with [facility] but patient is refusing further evaluation at this time.         Current Assessment & Plan     Wound is improved.    There is less drainage and good advancement of the skin edge.    Continue debridement to maintain active phase wound healing.    Continue silver alginate            Orthopedic    Chronic venous hypertension with ulcer and inflammation involving both sides    Overview     72-year-old diabetic referred to the Wound Center a right 3rd toe wound.  Patient with long history of chronic venous disease and lower extremity swelling.  Patient had uncontrolled swelling for a long period of time with multiple lower extremity wounds.  Patient's son now managing the edema with 2 layer compression.  He states there has been marked improvement over the past several months since he has been managing the wraps. Patient's son says he had looked into lower extremity evaluation for venous reflux and schedule an appointment with [facility] but patient is refusing further evaluation at this time.         Current  Assessment & Plan     Ulcers have healed.    Dermatitis is much improved.    Continue compression therapy.         Pressure ulcer of upper thigh, right, stage III    Overview     Patient informed us today of a chronic wound on the right posterior thigh.  Patient states that it has been present for several months.  She is had no real wound care to this area.  Patient says that wound occurred after sitting in a hard metal chair.  Patient denies any significant pain.  Patient denies any fever chills or significant drainage.         Current Assessment & Plan     Wound improved.    Continue debridement to maintain active phase wound healing.    Offloading            See wound doc progress notes. Documents will be scanned.        Kevin GARCIA Strong  [facility]

## 2023-06-12 NOTE — ASSESSMENT & PLAN NOTE
Wound is improved.    There is less drainage and good advancement of the skin edge.    Continue debridement to maintain active phase wound healing.    Continue silver alginate

## 2023-06-26 ENCOUNTER — OFFICE VISIT (OUTPATIENT)
Dept: WOUND CARE | Facility: HOSPITAL | Age: 73
End: 2023-06-26
Attending: SURGERY
Payer: COMMERCIAL

## 2023-06-26 VITALS
HEART RATE: 88 BPM | DIASTOLIC BLOOD PRESSURE: 69 MMHG | RESPIRATION RATE: 18 BRPM | SYSTOLIC BLOOD PRESSURE: 137 MMHG | TEMPERATURE: 98 F

## 2023-06-26 DIAGNOSIS — E11.621 DIABETIC ULCER OF TOE OF RIGHT FOOT ASSOCIATED WITH TYPE 2 DIABETES MELLITUS, WITH FAT LAYER EXPOSED: ICD-10-CM

## 2023-06-26 DIAGNOSIS — L89.213 PRESSURE ULCER OF UPPER THIGH, RIGHT, STAGE III: ICD-10-CM

## 2023-06-26 DIAGNOSIS — L97.512 RIGHT FOOT ULCER, WITH FAT LAYER EXPOSED: Primary | ICD-10-CM

## 2023-06-26 DIAGNOSIS — L97.512 DIABETIC ULCER OF TOE OF RIGHT FOOT ASSOCIATED WITH TYPE 2 DIABETES MELLITUS, WITH FAT LAYER EXPOSED: ICD-10-CM

## 2023-06-26 DIAGNOSIS — L89.893 DECUBITUS ULCER OF FOOT, STAGE 3: ICD-10-CM

## 2023-06-26 DIAGNOSIS — I87.333 CHRONIC VENOUS HYPERTENSION WITH ULCER AND INFLAMMATION INVOLVING BOTH SIDES: ICD-10-CM

## 2023-06-26 PROCEDURE — 99499 UNLISTED E&M SERVICE: CPT | Mod: ,,, | Performed by: SURGERY

## 2023-06-26 PROCEDURE — 11042 DBRDMT SUBQ TIS 1ST 20SQCM/<: CPT

## 2023-06-26 PROCEDURE — 99499 NO LOS: ICD-10-PCS | Mod: ,,, | Performed by: SURGERY

## 2023-06-26 PROCEDURE — 29581 APPL MULTLAYER CMPRN SYS LEG: CPT | Mod: RT

## 2023-06-26 NOTE — ASSESSMENT & PLAN NOTE
Wound has been slow to progress.    Continue debridement to maintain active phase wound healing.    Continue silver alginate.

## 2023-06-26 NOTE — PROGRESS NOTES
[facility]  Wound Care  Progress Note    Problem List Items Addressed This Visit          Endocrine    Diabetic ulcer of toe of right foot associated with type 2 diabetes mellitus, with fat layer exposed    Overview     72-year-old diabetic referred to the Wound Center a right 3rd toe wound.  Patient with long history of chronic venous disease and lower extremity swelling.  Patient had uncontrolled swelling for a long period of time with multiple lower extremity wounds.  Patient's son now managing the edema with 2 layer compression.  He states there has been marked improvement over the past several months since he has been managing the wraps.  Patient has persistent wound on the right 3rd toe.  There has been no significant drainage.  No erythema reported.  Last hemoglobin A1c 6.8.  Patient's son says he had looked into lower extremity evaluation for venous reflux and schedule an appointment with [facility] but patient is refusing further evaluation at this time.         Current Assessment & Plan     Wound has been slow to progress.    Continue debridement to maintain active phase wound healing.    Continue silver alginate.            Orthopedic    Chronic venous hypertension with ulcer and inflammation involving both sides    Overview     72-year-old diabetic referred to the Wound Center a right 3rd toe wound.  Patient with long history of chronic venous disease and lower extremity swelling.  Patient had uncontrolled swelling for a long period of time with multiple lower extremity wounds.  Patient's son now managing the edema with 2 layer compression.  He states there has been marked improvement over the past several months since he has been managing the wraps. Patient's son says he had looked into lower extremity evaluation for venous reflux and schedule an appointment with [facility] but patient is refusing further evaluation at this time.         Current Assessment & Plan     Wounds are healed.    Can you  compression therapy until stockings are brought next week.         Pressure ulcer of upper thigh, right, stage III    Overview     Patient informed us today of a chronic wound on the right posterior thigh.  Patient states that it has been present for several months.  She is had no real wound care to this area.  Patient says that wound occurred after sitting in a hard metal chair.  Patient denies any significant pain.  Patient denies any fever chills or significant drainage.  As of 6/26/2023 thigh wound is healed.         Current Assessment & Plan     Wound is now healed.            See wound doc progress notes. Documents will be scanned.        Kevin GARCIA Strong  [facility]

## 2023-07-24 ENCOUNTER — OFFICE VISIT (OUTPATIENT)
Dept: WOUND CARE | Facility: HOSPITAL | Age: 73
End: 2023-07-24
Attending: SURGERY
Payer: COMMERCIAL

## 2023-07-24 VITALS
RESPIRATION RATE: 20 BRPM | HEART RATE: 76 BPM | SYSTOLIC BLOOD PRESSURE: 129 MMHG | TEMPERATURE: 98 F | DIASTOLIC BLOOD PRESSURE: 76 MMHG

## 2023-07-24 DIAGNOSIS — I87.333 CHRONIC VENOUS HYPERTENSION WITH ULCER AND INFLAMMATION INVOLVING BOTH SIDES: ICD-10-CM

## 2023-07-24 DIAGNOSIS — E11.621 DIABETIC ULCER OF TOE OF RIGHT FOOT ASSOCIATED WITH TYPE 2 DIABETES MELLITUS, WITH FAT LAYER EXPOSED: ICD-10-CM

## 2023-07-24 DIAGNOSIS — L97.512 DIABETIC ULCER OF TOE OF RIGHT FOOT ASSOCIATED WITH TYPE 2 DIABETES MELLITUS, WITH FAT LAYER EXPOSED: ICD-10-CM

## 2023-07-24 DIAGNOSIS — L97.512 RIGHT FOOT ULCER, WITH FAT LAYER EXPOSED: Primary | ICD-10-CM

## 2023-07-24 DIAGNOSIS — L89.213 PRESSURE ULCER OF UPPER THIGH, RIGHT, STAGE III: ICD-10-CM

## 2023-07-24 PROCEDURE — 99499 NO LOS: ICD-10-PCS | Mod: ,,, | Performed by: SURGERY

## 2023-07-24 PROCEDURE — 11042 DBRDMT SUBQ TIS 1ST 20SQCM/<: CPT

## 2023-07-24 PROCEDURE — 99499 UNLISTED E&M SERVICE: CPT | Mod: ,,, | Performed by: SURGERY

## 2023-07-24 NOTE — PROGRESS NOTES
[facility]  Wound Care  Progress Note    Problem List Items Addressed This Visit       Diabetic ulcer of toe of right foot associated with type 2 diabetes mellitus, with fat layer exposed    Overview     72-year-old diabetic referred to the Wound Center a right 3rd toe wound.  Patient with long history of chronic venous disease and lower extremity swelling.  Patient had uncontrolled swelling for a long period of time with multiple lower extremity wounds.  Patient's son now managing the edema with 2 layer compression.  He states there has been marked improvement over the past several months since he has been managing the wraps.  Patient has persistent wound on the right 3rd toe.  There has been no significant drainage.  No erythema reported.  Last hemoglobin A1c 6.8.  Patient's son says he had looked into lower extremity evaluation for venous reflux and schedule an appointment with [facility] but patient is refusing further evaluation at this time.         Current Assessment & Plan     Wound with hypergranulation tissue.  No sign of infection.  Debridement performed today.  Continue silver dressing.         Chronic venous hypertension with ulcer and inflammation involving both sides    Overview     72-year-old diabetic referred to the Wound Center a right 3rd toe wound.  Patient with long history of chronic venous disease and lower extremity swelling.  Patient had uncontrolled swelling for a long period of time with multiple lower extremity wounds.  Patient's son now managing the edema with 2 layer compression.  He states there has been marked improvement over the past several months since he has been managing the wraps. Patient's son says he had looked into lower extremity evaluation for venous reflux and schedule an appointment with [facility] but patient is refusing further evaluation at this time.         Current Assessment & Plan     Edema fairly well controlled.  Patient to start juxta Lite stockings  today.         Pressure ulcer of upper thigh, right, stage III    Overview     Patient informed us today of a chronic wound on the right posterior thigh.  Patient states that it has been present for several months.  She is had no real wound care to this area.  Patient says that wound occurred after sitting in a hard metal chair.  Patient denies any significant pain.  Patient denies any fever chills or significant drainage.  As of 6/26/2023 thigh wound is healed.         Current Assessment & Plan     Wound clean and granulating.  No sign of infection.  Continue silver dressing            See wound doc progress notes. Documents will be scanned.        Alcides BURRELL Marino [facility]

## 2023-07-24 NOTE — ASSESSMENT & PLAN NOTE
Wound with hypergranulation tissue.  No sign of infection.  Debridement performed today.  Continue silver dressing.

## 2023-07-27 NOTE — ASSESSMENT & PLAN NOTE
Wound is slightly improved.    There is fragile granulation tissue present.  The wound bleeds easily.    Some nonviable slough was removed with a curette into the subcutaneous tissue.    Continue silver alginate.   FAMILY HISTORY:  Father  Still living? No  Family history of throat cancer, Age at diagnosis: Age Unknown    Mother  Still living? Unknown  Family history of leukemia, Age at diagnosis: Age Unknown

## 2023-07-31 ENCOUNTER — OFFICE VISIT (OUTPATIENT)
Dept: WOUND CARE | Facility: HOSPITAL | Age: 73
End: 2023-07-31
Attending: SURGERY
Payer: COMMERCIAL

## 2023-07-31 VITALS
RESPIRATION RATE: 19 BRPM | HEART RATE: 78 BPM | DIASTOLIC BLOOD PRESSURE: 68 MMHG | TEMPERATURE: 99 F | SYSTOLIC BLOOD PRESSURE: 131 MMHG

## 2023-07-31 DIAGNOSIS — L97.512 DIABETIC ULCER OF TOE OF RIGHT FOOT ASSOCIATED WITH TYPE 2 DIABETES MELLITUS, WITH FAT LAYER EXPOSED: ICD-10-CM

## 2023-07-31 DIAGNOSIS — L89.213 PRESSURE ULCER OF UPPER THIGH, RIGHT, STAGE III: Primary | ICD-10-CM

## 2023-07-31 DIAGNOSIS — I87.333 CHRONIC VENOUS HYPERTENSION WITH ULCER AND INFLAMMATION INVOLVING BOTH SIDES: ICD-10-CM

## 2023-07-31 DIAGNOSIS — L97.512 RIGHT FOOT ULCER, WITH FAT LAYER EXPOSED: ICD-10-CM

## 2023-07-31 DIAGNOSIS — I89.0 LYMPHEDEMA DUE TO CHRONIC INFLAMMATION: ICD-10-CM

## 2023-07-31 DIAGNOSIS — E11.621 DIABETIC ULCER OF TOE OF RIGHT FOOT ASSOCIATED WITH TYPE 2 DIABETES MELLITUS, WITH FAT LAYER EXPOSED: ICD-10-CM

## 2023-07-31 PROCEDURE — 99499 UNLISTED E&M SERVICE: CPT | Mod: ,,, | Performed by: SURGERY

## 2023-07-31 PROCEDURE — 99499 NO LOS: ICD-10-PCS | Mod: ,,, | Performed by: SURGERY

## 2023-07-31 PROCEDURE — 11042 DBRDMT SUBQ TIS 1ST 20SQCM/<: CPT

## 2023-07-31 NOTE — ASSESSMENT & PLAN NOTE
Wound improved.    Continued debridement to maintain active phase wound healing.    Continue offloading.

## 2023-07-31 NOTE — ASSESSMENT & PLAN NOTE
Wound slightly improved.    Continued debridement to maintain active phase wound healing and remove nonviable tissue.    Continue offloading.

## 2023-07-31 NOTE — PROGRESS NOTES
[facility]  Wound Care  Progress Note    Problem List Items Addressed This Visit          Endocrine    Diabetic ulcer of toe of right foot associated with type 2 diabetes mellitus, with fat layer exposed    Overview     72-year-old diabetic referred to the Wound Center a right 3rd toe wound.  Patient with long history of chronic venous disease and lower extremity swelling.  Patient had uncontrolled swelling for a long period of time with multiple lower extremity wounds.  Patient's son now managing the edema with 2 layer compression.  He states there has been marked improvement over the past several months since he has been managing the wraps.  Patient has persistent wound on the right 3rd toe.  There has been no significant drainage.  No erythema reported.  Last hemoglobin A1c 6.8.  Patient's son says he had looked into lower extremity evaluation for venous reflux and schedule an appointment with [facility] but patient is refusing further evaluation at this time.         Current Assessment & Plan     Wound slightly improved.    Continued debridement to maintain active phase wound healing and remove nonviable tissue.    Continue offloading.            Orthopedic    Chronic venous hypertension with ulcer and inflammation involving both sides    Overview     72-year-old diabetic referred to the Wound Center a right 3rd toe wound.  Patient with long history of chronic venous disease and lower extremity swelling.  Patient had uncontrolled swelling for a long period of time with multiple lower extremity wounds.  Patient's son now managing the edema with 2 layer compression.  He states there has been marked improvement over the past several months since he has been managing the wraps. Patient's son says he had looked into lower extremity evaluation for venous reflux and schedule an appointment with [facility] but patient is refusing further evaluation at this time.         Current Assessment & Plan     Edema is  controlled with stockings.    There was generalized scaling but no wound.         Pressure ulcer of upper thigh, right, stage III - Primary    Overview     Patient informed us today of a chronic wound on the right posterior thigh.  Patient states that it has been present for several months.  She is had no real wound care to this area.  Patient says that wound occurred after sitting in a hard metal chair.  Patient denies any significant pain.  Patient denies any fever chills or significant drainage.  As of 6/26/2023 thigh wound is healed.         Current Assessment & Plan     Wound improved.    Continued debridement to maintain active phase wound healing.    Continue offloading.            Other    Lymphedema due to chronic inflammation     Other Visit Diagnoses       Right foot ulcer, with fat layer exposed                See wound doc progress notes. Documents will be scanned.        Kevin GARCIA Strong  [facility]

## 2023-08-07 ENCOUNTER — OFFICE VISIT (OUTPATIENT)
Dept: WOUND CARE | Facility: HOSPITAL | Age: 73
End: 2023-08-07
Attending: SURGERY
Payer: COMMERCIAL

## 2023-08-07 VITALS
HEART RATE: 78 BPM | TEMPERATURE: 98 F | DIASTOLIC BLOOD PRESSURE: 72 MMHG | RESPIRATION RATE: 18 BRPM | SYSTOLIC BLOOD PRESSURE: 125 MMHG

## 2023-08-07 DIAGNOSIS — E11.621 DIABETIC ULCER OF TOE OF RIGHT FOOT ASSOCIATED WITH TYPE 2 DIABETES MELLITUS, WITH FAT LAYER EXPOSED: ICD-10-CM

## 2023-08-07 DIAGNOSIS — L97.512 DIABETIC ULCER OF TOE OF RIGHT FOOT ASSOCIATED WITH TYPE 2 DIABETES MELLITUS, WITH FAT LAYER EXPOSED: ICD-10-CM

## 2023-08-07 DIAGNOSIS — L89.213 PRESSURE ULCER OF UPPER THIGH, RIGHT, STAGE III: ICD-10-CM

## 2023-08-07 DIAGNOSIS — L97.512 RIGHT FOOT ULCER, WITH FAT LAYER EXPOSED: Primary | ICD-10-CM

## 2023-08-07 PROCEDURE — 11042 DBRDMT SUBQ TIS 1ST 20SQCM/<: CPT

## 2023-08-07 PROCEDURE — 99499 UNLISTED E&M SERVICE: CPT | Mod: ,,, | Performed by: SURGERY

## 2023-08-07 PROCEDURE — 99499 NO LOS: ICD-10-PCS | Mod: ,,, | Performed by: SURGERY

## 2023-08-07 NOTE — PROGRESS NOTES
[facility]  Wound Care  Progress Note    Problem List Items Addressed This Visit       Diabetic ulcer of toe of right foot associated with type 2 diabetes mellitus, with fat layer exposed    Overview     72-year-old diabetic referred to the Wound Center a right 3rd toe wound.  Patient with long history of chronic venous disease and lower extremity swelling.  Patient had uncontrolled swelling for a long period of time with multiple lower extremity wounds.  Patient's son now managing the edema with 2 layer compression.  He states there has been marked improvement over the past several months since he has been managing the wraps.  Patient has persistent wound on the right 3rd toe.  There has been no significant drainage.  No erythema reported.  Last hemoglobin A1c 6.8.  Patient's son says he had looked into lower extremity evaluation for venous reflux and schedule an appointment with [facility] but patient is refusing further evaluation at this time.         Current Assessment & Plan     Wound improved.  Minimal surrounding callus.  No significant drainage.  Debridement performed today.  Continue offloading.  Continue current management         Pressure ulcer of upper thigh, right, stage III    Overview     Patient informed us today of a chronic wound on the right posterior thigh.  Patient states that it has been present for several months.  She is had no real wound care to this area.  Patient says that wound occurred after sitting in a hard metal chair.  Patient denies any significant pain.  Patient denies any fever chills or significant drainage.  As of 6/26/2023 thigh wound is healed.         Current Assessment & Plan     Wound remains clean and granulating.  Continue debridement is needed.  Continue offloading            See wound doc progress notes. Documents will be scanned.        Alcides Kramer  [facility]

## 2023-08-07 NOTE — ASSESSMENT & PLAN NOTE
Wound improved.  Minimal surrounding callus.  No significant drainage.  Debridement performed today.  Continue offloading.  Continue current management

## 2023-08-14 ENCOUNTER — OFFICE VISIT (OUTPATIENT)
Dept: WOUND CARE | Facility: HOSPITAL | Age: 73
End: 2023-08-14
Attending: SURGERY
Payer: COMMERCIAL

## 2023-08-14 VITALS
SYSTOLIC BLOOD PRESSURE: 126 MMHG | RESPIRATION RATE: 18 BRPM | TEMPERATURE: 98 F | HEART RATE: 73 BPM | DIASTOLIC BLOOD PRESSURE: 78 MMHG

## 2023-08-14 DIAGNOSIS — L97.512 RIGHT FOOT ULCER, WITH FAT LAYER EXPOSED: Primary | ICD-10-CM

## 2023-08-14 DIAGNOSIS — E11.621 DIABETIC ULCER OF TOE OF RIGHT FOOT ASSOCIATED WITH TYPE 2 DIABETES MELLITUS, WITH FAT LAYER EXPOSED: ICD-10-CM

## 2023-08-14 DIAGNOSIS — I89.0 LYMPHEDEMA DUE TO CHRONIC INFLAMMATION: ICD-10-CM

## 2023-08-14 DIAGNOSIS — L97.512 DIABETIC ULCER OF TOE OF RIGHT FOOT ASSOCIATED WITH TYPE 2 DIABETES MELLITUS, WITH FAT LAYER EXPOSED: ICD-10-CM

## 2023-08-14 DIAGNOSIS — I87.333 CHRONIC VENOUS HYPERTENSION WITH ULCER AND INFLAMMATION INVOLVING BOTH SIDES: ICD-10-CM

## 2023-08-14 DIAGNOSIS — L89.213 PRESSURE ULCER OF UPPER THIGH, RIGHT, STAGE III: ICD-10-CM

## 2023-08-14 PROCEDURE — 99499 NO LOS: ICD-10-PCS | Mod: ,,, | Performed by: SURGERY

## 2023-08-14 PROCEDURE — 99499 UNLISTED E&M SERVICE: CPT | Mod: ,,, | Performed by: SURGERY

## 2023-08-14 PROCEDURE — 11042 DBRDMT SUBQ TIS 1ST 20SQCM/<: CPT

## 2023-08-14 PROCEDURE — 29581 APPL MULTLAYER CMPRN SYS LEG: CPT

## 2023-08-14 NOTE — PROGRESS NOTES
[facility]  Wound Care  Progress Note    Problem List Items Addressed This Visit          Endocrine    Diabetic ulcer of toe of right foot associated with type 2 diabetes mellitus, with fat layer exposed    Overview     72-year-old diabetic referred to the Wound Center a right 3rd toe wound.  Patient with long history of chronic venous disease and lower extremity swelling.  Patient had uncontrolled swelling for a long period of time with multiple lower extremity wounds.  Patient's son now managing the edema with 2 layer compression.  He states there has been marked improvement over the past several months since he has been managing the wraps.  Patient has persistent wound on the right 3rd toe.  There has been no significant drainage.  No erythema reported.  Last hemoglobin A1c 6.8.  Patient's son says he had looked into lower extremity evaluation for venous reflux and schedule an appointment with [facility] but patient is refusing further evaluation at this time.         Current Assessment & Plan     Wound is slightly improved.    Continue debridement to maintain active phase wound healing.            Orthopedic    Chronic venous hypertension with ulcer and inflammation involving both sides    Overview     72-year-old diabetic referred to the Wound Center a right 3rd toe wound.  Patient with long history of chronic venous disease and lower extremity swelling.  Patient had uncontrolled swelling for a long period of time with multiple lower extremity wounds.  Patient's son now managing the edema with 2 layer compression.  He states there has been marked improvement over the past several months since he has been managing the wraps. Patient's son says he had looked into lower extremity evaluation for venous reflux and schedule an appointment with [facility] but patient is refusing further evaluation at this time.         Current Assessment & Plan     Patient with increased swelling today.  She did not apply her  stockings.         Pressure ulcer of upper thigh, right, stage III    Overview     Patient informed us today of a chronic wound on the right posterior thigh.  Patient states that it has been present for several months.  She is had no real wound care to this area.  Patient says that wound occurred after sitting in a hard metal chair.  Patient denies any significant pain.  Patient denies any fever chills or significant drainage.  As of 6/26/2023 thigh wound is healed.         Current Assessment & Plan     Wound is improved.    Continue debridement to maintain active phase wound healing.            Other    Lymphedema due to chronic inflammation     Other Visit Diagnoses       Right foot ulcer, with fat layer exposed    -  Primary            See wound doc progress notes. Documents will be scanned.        Kevin GARCIA Hebert [facility]

## 2023-09-11 ENCOUNTER — OFFICE VISIT (OUTPATIENT)
Dept: WOUND CARE | Facility: HOSPITAL | Age: 73
End: 2023-09-11
Attending: SURGERY
Payer: COMMERCIAL

## 2023-09-11 VITALS
TEMPERATURE: 99 F | DIASTOLIC BLOOD PRESSURE: 80 MMHG | RESPIRATION RATE: 20 BRPM | HEART RATE: 76 BPM | SYSTOLIC BLOOD PRESSURE: 125 MMHG

## 2023-09-11 DIAGNOSIS — I87.2 CHRONIC VENOUS STASIS DERMATITIS OF BOTH LOWER EXTREMITIES: ICD-10-CM

## 2023-09-11 DIAGNOSIS — I87.333 CHRONIC VENOUS HYPERTENSION WITH ULCER AND INFLAMMATION INVOLVING BOTH SIDES: ICD-10-CM

## 2023-09-11 DIAGNOSIS — L97.512 DIABETIC ULCER OF TOE OF RIGHT FOOT ASSOCIATED WITH TYPE 2 DIABETES MELLITUS, WITH FAT LAYER EXPOSED: ICD-10-CM

## 2023-09-11 DIAGNOSIS — E11.621 DIABETIC ULCER OF TOE OF RIGHT FOOT ASSOCIATED WITH TYPE 2 DIABETES MELLITUS, WITH FAT LAYER EXPOSED: ICD-10-CM

## 2023-09-11 DIAGNOSIS — L89.213 PRESSURE ULCER OF UPPER THIGH, RIGHT, STAGE III: ICD-10-CM

## 2023-09-11 PROCEDURE — 99499 NO LOS: ICD-10-PCS | Mod: ,,, | Performed by: SURGERY

## 2023-09-11 PROCEDURE — 11042 DBRDMT SUBQ TIS 1ST 20SQCM/<: CPT

## 2023-09-11 PROCEDURE — 99499 UNLISTED E&M SERVICE: CPT | Mod: ,,, | Performed by: SURGERY

## 2023-09-11 NOTE — PROGRESS NOTES
[facility]  Wound Care  Progress Note    Problem List Items Addressed This Visit          Cardiac/Vascular    Chronic venous stasis dermatitis of both lower extremities    Current Assessment & Plan     Patient Is utilizing juxta Lite stockings for edema control.  There is good edema control and no significant inflammation.            Endocrine    Diabetic ulcer of toe of right foot associated with type 2 diabetes mellitus, with fat layer exposed    Overview     72-year-old diabetic referred to the Wound Center a right 3rd toe wound.  Patient with long history of chronic venous disease and lower extremity swelling.  Patient had uncontrolled swelling for a long period of time with multiple lower extremity wounds.  Patient's son now managing the edema with 2 layer compression.  He states there has been marked improvement over the past several months since he has been managing the wraps.  Patient has persistent wound on the right 3rd toe.  There has been no significant drainage.  No erythema reported.  Last hemoglobin A1c 6.8.  Patient's son says he had looked into lower extremity evaluation for venous reflux and schedule an appointment with [facility] but patient is refusing further evaluation at this time.         Current Assessment & Plan     Wound is improved.  There is significant advancement of epithelium.    Excisional debridement performed of nonviable tissue and callus.    Continue gentian violet for drying.            Orthopedic    Chronic venous hypertension with ulcer and inflammation involving both sides    Overview     72-year-old diabetic referred to the Wound Center a right 3rd toe wound.  Patient with long history of chronic venous disease and lower extremity swelling.  Patient had uncontrolled swelling for a long period of time with multiple lower extremity wounds.  Patient's son now managing the edema with 2 layer compression.  He states there has been marked improvement over the past  several months since he has been managing the wraps. Patient's son says he had looked into lower extremity evaluation for venous reflux and schedule an appointment with [facility] but patient is refusing further evaluation at this time.         Current Assessment & Plan     Much improved with juxta Lites.    Appears to be controlled.    Patient instructed she will utilize stockings the remainder of her life for edema control.         Pressure ulcer of upper thigh, right, stage III    Overview     Patient informed us today of a chronic wound on the right posterior thigh.  Patient states that it has been present for several months.  She is had no real wound care to this area.  Patient says that wound occurred after sitting in a hard metal chair.  Patient denies any significant pain.  Patient denies any fever chills or significant drainage.  As of 6/26/2023 thigh wound is healed.         Current Assessment & Plan     Patient has developed 2 additional satellite wounds.  Continue debridement to maintain active phase wound healing.    As patient to concentrate on offloading the area.    Continue silver alginate.            See wound doc progress notes. Documents will be scanned.        Kevin Strong  [facility]

## 2023-09-11 NOTE — ASSESSMENT & PLAN NOTE
Wound is improved.  There is significant advancement of epithelium.    Excisional debridement performed of nonviable tissue and callus.    Continue gentian violet for drying.

## 2023-09-11 NOTE — ASSESSMENT & PLAN NOTE
Much improved with juxta Lites.    Appears to be controlled.    Patient instructed she will utilize stockings the remainder of her life for edema control.

## 2023-09-11 NOTE — ASSESSMENT & PLAN NOTE
Patient Is utilizing juxta Lite stockings for edema control.  There is good edema control and no significant inflammation.

## 2023-09-11 NOTE — ASSESSMENT & PLAN NOTE
Patient has developed 2 additional satellite wounds.  Continue debridement to maintain active phase wound healing.    As patient to concentrate on offloading the area.    Continue silver alginate.

## 2023-09-18 ENCOUNTER — OFFICE VISIT (OUTPATIENT)
Dept: WOUND CARE | Facility: HOSPITAL | Age: 73
End: 2023-09-18
Attending: SURGERY
Payer: COMMERCIAL

## 2023-09-18 VITALS
HEART RATE: 75 BPM | SYSTOLIC BLOOD PRESSURE: 122 MMHG | TEMPERATURE: 99 F | DIASTOLIC BLOOD PRESSURE: 78 MMHG | RESPIRATION RATE: 19 BRPM

## 2023-09-18 DIAGNOSIS — I87.333 CHRONIC VENOUS HYPERTENSION WITH ULCER AND INFLAMMATION INVOLVING BOTH SIDES: ICD-10-CM

## 2023-09-18 DIAGNOSIS — L97.512 DIABETIC ULCER OF TOE OF RIGHT FOOT ASSOCIATED WITH TYPE 2 DIABETES MELLITUS, WITH FAT LAYER EXPOSED: ICD-10-CM

## 2023-09-18 DIAGNOSIS — E11.621 DIABETIC ULCER OF TOE OF RIGHT FOOT ASSOCIATED WITH TYPE 2 DIABETES MELLITUS, WITH FAT LAYER EXPOSED: ICD-10-CM

## 2023-09-18 DIAGNOSIS — L89.213 PRESSURE ULCER OF UPPER THIGH, RIGHT, STAGE III: ICD-10-CM

## 2023-09-18 PROCEDURE — 29581 APPL MULTLAYER CMPRN SYS LEG: CPT | Mod: RT

## 2023-09-18 PROCEDURE — 99499 UNLISTED E&M SERVICE: CPT | Mod: ,,, | Performed by: SURGERY

## 2023-09-18 PROCEDURE — 97602 WOUND(S) CARE NON-SELECTIVE: CPT

## 2023-09-18 PROCEDURE — 11042 DBRDMT SUBQ TIS 1ST 20SQCM/<: CPT

## 2023-09-18 PROCEDURE — 99499 NO LOS: ICD-10-PCS | Mod: ,,, | Performed by: SURGERY

## 2023-09-18 NOTE — ASSESSMENT & PLAN NOTE
Wound is slightly improved however there are couple of satellite.    Continue debridement to maintain active phase wound healing.    Cushion chair to assist with sliding.

## 2023-09-18 NOTE — PROGRESS NOTES
[facility]  Wound Care  Progress Note    Problem List Items Addressed This Visit          Endocrine    Diabetic ulcer of toe of right foot associated with type 2 diabetes mellitus, with fat layer exposed    Overview     72-year-old diabetic referred to the Wound Center a right 3rd toe wound.  Patient with long history of chronic venous disease and lower extremity swelling.  Patient had uncontrolled swelling for a long period of time with multiple lower extremity wounds.  Patient's son now managing the edema with 2 layer compression.  He states there has been marked improvement over the past several months since he has been managing the wraps.  Patient has persistent wound on the right 3rd toe.  There has been no significant drainage.  No erythema reported.  Last hemoglobin A1c 6.8.  Patient's son says he had looked into lower extremity evaluation for venous reflux and schedule an appointment with [facility] but patient is refusing further evaluation at this time.         Current Assessment & Plan     Wound appears healed.    Continue gentian violet.    Monitor.            Orthopedic    Chronic venous hypertension with ulcer and inflammation involving both sides    Overview     72-year-old diabetic referred to the Wound Center a right 3rd toe wound.  Patient with long history of chronic venous disease and lower extremity swelling.  Patient had uncontrolled swelling for a long period of time with multiple lower extremity wounds.  Patient's son now managing the edema with 2 layer compression.  He states there has been marked improvement over the past several months since he has been managing the wraps. Patient's son says he had looked into lower extremity evaluation for venous reflux and schedule an appointment with [facility] but patient is refusing further evaluation at this time.         Current Assessment & Plan     Continue CircAid to the left leg.    Resume compression wraps to the right leg.          Pressure ulcer of upper thigh, right, stage III    Overview     Patient informed us today of a chronic wound on the right posterior thigh.  Patient states that it has been present for several months.  She is had no real wound care to this area.  Patient says that wound occurred after sitting in a hard metal chair.  Patient denies any significant pain.  Patient denies any fever chills or significant drainage.  As of 6/26/2023 thigh wound is healed.         Current Assessment & Plan     Wound is slightly improved however there are couple of satellite.    Continue debridement to maintain active phase wound healing.    Cushion chair to assist with sliding.            See wound doc progress notes. Documents will be scanned.        Kevin GARCAI Strong  [facility]

## 2023-10-02 ENCOUNTER — OFFICE VISIT (OUTPATIENT)
Dept: WOUND CARE | Facility: HOSPITAL | Age: 73
End: 2023-10-02
Attending: SURGERY
Payer: COMMERCIAL

## 2023-10-02 VITALS
HEART RATE: 79 BPM | DIASTOLIC BLOOD PRESSURE: 72 MMHG | RESPIRATION RATE: 19 BRPM | TEMPERATURE: 97 F | SYSTOLIC BLOOD PRESSURE: 132 MMHG

## 2023-10-02 DIAGNOSIS — L89.213 PRESSURE ULCER OF UPPER THIGH, RIGHT, STAGE III: ICD-10-CM

## 2023-10-02 DIAGNOSIS — E11.621 DIABETIC ULCER OF TOE OF RIGHT FOOT ASSOCIATED WITH TYPE 2 DIABETES MELLITUS, WITH FAT LAYER EXPOSED: ICD-10-CM

## 2023-10-02 DIAGNOSIS — L97.512 DIABETIC ULCER OF TOE OF RIGHT FOOT ASSOCIATED WITH TYPE 2 DIABETES MELLITUS, WITH FAT LAYER EXPOSED: ICD-10-CM

## 2023-10-02 DIAGNOSIS — I87.333 CHRONIC VENOUS HYPERTENSION WITH ULCER AND INFLAMMATION INVOLVING BOTH SIDES: ICD-10-CM

## 2023-10-02 PROCEDURE — 99499 UNLISTED E&M SERVICE: CPT | Mod: ,,, | Performed by: SURGERY

## 2023-10-02 PROCEDURE — 99499 NO LOS: ICD-10-PCS | Mod: ,,, | Performed by: SURGERY

## 2023-10-02 PROCEDURE — 11042 DBRDMT SUBQ TIS 1ST 20SQCM/<: CPT

## 2023-10-02 NOTE — PROGRESS NOTES
[facility]  Wound Care  Progress Note    Problem List Items Addressed This Visit       Diabetic ulcer of toe of right foot associated with type 2 diabetes mellitus, with fat layer exposed    Overview     72-year-old diabetic referred to the Wound Center a right 3rd toe wound.  Patient with long history of chronic venous disease and lower extremity swelling.  Patient had uncontrolled swelling for a long period of time with multiple lower extremity wounds.  Patient's son now managing the edema with 2 layer compression.  He states there has been marked improvement over the past several months since he has been managing the wraps.  Patient has persistent wound on the right 3rd toe.  There has been no significant drainage.  No erythema reported.  Last hemoglobin A1c 6.8.  Patient's son says he had looked into lower extremity evaluation for venous reflux and schedule an appointment with [facility] but patient is refusing further evaluation at this time.         Current Assessment & Plan     Right toe wound remains healed.         Chronic venous hypertension with ulcer and inflammation involving both sides    Overview     72-year-old diabetic referred to the Wound Center a right 3rd toe wound.  Patient with long history of chronic venous disease and lower extremity swelling.  Patient had uncontrolled swelling for a long period of time with multiple lower extremity wounds.  Patient's son now managing the edema with 2 layer compression.  He states there has been marked improvement over the past several months since he has been managing the wraps. Patient's son says he had looked into lower extremity evaluation for venous reflux and schedule an appointment with [facility] but patient is refusing further evaluation at this time.         Current Assessment & Plan     Edema well controlled.  Will return to CercAid to both lower extremities to assist with edema control         Pressure ulcer of upper thigh, right, stage III     Overview     Patient informed us today of a chronic wound on the right posterior thigh.  Patient states that it has been present for several months.  She is had no real wound care to this area.  Patient says that wound occurred after sitting in a hard metal chair.  Patient denies any significant pain.  Patient denies any fever chills or significant drainage.  As of 6/26/2023 thigh wound is healed.         Current Assessment & Plan     Wound remains clean and granulating.  No sign of infection.  Continue current management            See wound doc progress notes. Documents will be scanned.        Alcides BURRELL Kramer  [facility]

## 2023-10-02 NOTE — ASSESSMENT & PLAN NOTE
Edema well controlled.  Will return to CercAid to both lower extremities to assist with edema control

## 2023-10-09 ENCOUNTER — OFFICE VISIT (OUTPATIENT)
Dept: WOUND CARE | Facility: HOSPITAL | Age: 73
End: 2023-10-09
Attending: SURGERY
Payer: COMMERCIAL

## 2023-10-09 VITALS
TEMPERATURE: 98 F | SYSTOLIC BLOOD PRESSURE: 129 MMHG | RESPIRATION RATE: 19 BRPM | HEART RATE: 76 BPM | DIASTOLIC BLOOD PRESSURE: 74 MMHG

## 2023-10-09 DIAGNOSIS — L89.213 PRESSURE ULCER OF UPPER THIGH, RIGHT, STAGE III: ICD-10-CM

## 2023-10-09 DIAGNOSIS — I87.333 CHRONIC VENOUS HYPERTENSION WITH ULCER AND INFLAMMATION INVOLVING BOTH SIDES: ICD-10-CM

## 2023-10-09 PROBLEM — L97.512 DIABETIC ULCER OF TOE OF RIGHT FOOT ASSOCIATED WITH TYPE 2 DIABETES MELLITUS, WITH FAT LAYER EXPOSED: Status: RESOLVED | Noted: 2023-04-10 | Resolved: 2023-10-09

## 2023-10-09 PROBLEM — E11.621 DIABETIC ULCER OF TOE OF RIGHT FOOT ASSOCIATED WITH TYPE 2 DIABETES MELLITUS, WITH FAT LAYER EXPOSED: Status: RESOLVED | Noted: 2023-04-10 | Resolved: 2023-10-09

## 2023-10-09 PROCEDURE — 11042 DBRDMT SUBQ TIS 1ST 20SQCM/<: CPT

## 2023-10-09 PROCEDURE — 99499 NO LOS: ICD-10-PCS | Mod: ,,, | Performed by: SURGERY

## 2023-10-09 PROCEDURE — 99499 UNLISTED E&M SERVICE: CPT | Mod: ,,, | Performed by: SURGERY

## 2023-10-09 PROCEDURE — 29581 APPL MULTLAYER CMPRN SYS LEG: CPT

## 2023-10-09 NOTE — PROGRESS NOTES
[facility]  Wound Care  Progress Note    Problem List Items Addressed This Visit          Orthopedic    Chronic venous hypertension with ulcer and inflammation involving both sides    Overview     72-year-old diabetic referred to the Wound Center a right 3rd toe wound.  Patient with long history of chronic venous disease and lower extremity swelling.  Patient had uncontrolled swelling for a long period of time with multiple lower extremity wounds.  Patient's son now managing the edema with 2 layer compression.  He states there has been marked improvement over the past several months since he has been managing the wraps. Patient's son says he had looked into lower extremity evaluation for venous reflux and schedule an appointment with [facility] but patient is refusing further evaluation at this time.         Current Assessment & Plan     Wounds remain healed.    Continue compression therapy for control of swelling.         Pressure ulcer of upper thigh, right, stage III    Overview     Patient informed us today of a chronic wound on the right posterior thigh.  Patient states that it has been present for several months.  She is had no real wound care to this area.  Patient says that wound occurred after sitting in a hard metal chair.  Patient denies any significant pain.  Patient denies any fever chills or significant drainage.  As of 6/26/2023 thigh wound is healed.         Current Assessment & Plan     Wound improving.    Continue debridement and maintain active phase wound healing.    Continue offloading.            See wound doc progress notes. Documents will be scanned.        Kevin Strong  [facility]

## 2023-10-09 NOTE — ASSESSMENT & PLAN NOTE
Wound improving.    Continue debridement and maintain active phase wound healing.    Continue offloading.

## 2023-10-16 ENCOUNTER — OFFICE VISIT (OUTPATIENT)
Dept: WOUND CARE | Facility: HOSPITAL | Age: 73
End: 2023-10-16
Attending: SURGERY
Payer: COMMERCIAL

## 2023-10-16 VITALS
TEMPERATURE: 98 F | SYSTOLIC BLOOD PRESSURE: 127 MMHG | RESPIRATION RATE: 20 BRPM | HEART RATE: 72 BPM | DIASTOLIC BLOOD PRESSURE: 75 MMHG

## 2023-10-16 DIAGNOSIS — I87.333 CHRONIC VENOUS HYPERTENSION WITH ULCER AND INFLAMMATION INVOLVING BOTH SIDES: ICD-10-CM

## 2023-10-16 DIAGNOSIS — L89.213 PRESSURE ULCER OF UPPER THIGH, RIGHT, STAGE III: ICD-10-CM

## 2023-10-16 PROCEDURE — 99499 UNLISTED E&M SERVICE: CPT | Mod: ,,, | Performed by: SURGERY

## 2023-10-16 PROCEDURE — 99499 NO LOS: ICD-10-PCS | Mod: ,,, | Performed by: SURGERY

## 2023-10-16 PROCEDURE — 11042 DBRDMT SUBQ TIS 1ST 20SQCM/<: CPT

## 2023-10-16 NOTE — ASSESSMENT & PLAN NOTE
Wound remains clean.  Continue debridement is needed.  Continue silver dressing.  Continue offloading

## 2023-10-16 NOTE — PROGRESS NOTES
[facility]  Wound Care  Progress Note    Problem List Items Addressed This Visit       Chronic venous hypertension with ulcer and inflammation involving both sides    Overview     72-year-old diabetic referred to the Wound Center a right 3rd toe wound.  Patient with long history of chronic venous disease and lower extremity swelling.  Patient had uncontrolled swelling for a long period of time with multiple lower extremity wounds.  Patient's son now managing the edema with 2 layer compression.  He states there has been marked improvement over the past several months since he has been managing the wraps. Patient's son says he had looked into lower extremity evaluation for venous reflux and schedule an appointment with [facility] but patient is refusing further evaluation at this time.         Current Assessment & Plan     Wound remains resolved.  Continue compression.         Pressure ulcer of upper thigh, right, stage III    Overview     Patient informed us today of a chronic wound on the right posterior thigh.  Patient states that it has been present for several months.  She is had no real wound care to this area.  Patient says that wound occurred after sitting in a hard metal chair.  Patient denies any significant pain.  Patient denies any fever chills or significant drainage.  As of 6/26/2023 thigh wound is healed.         Current Assessment & Plan     Wound remains clean.  Continue debridement is needed.  Continue silver dressing.  Continue offloading            See wound doc progress notes. Documents will be scanned.        Alcides Kramer  [facility]

## 2023-10-30 ENCOUNTER — OFFICE VISIT (OUTPATIENT)
Dept: WOUND CARE | Facility: HOSPITAL | Age: 73
End: 2023-10-30
Attending: SURGERY
Payer: COMMERCIAL

## 2023-10-30 VITALS
SYSTOLIC BLOOD PRESSURE: 129 MMHG | DIASTOLIC BLOOD PRESSURE: 68 MMHG | RESPIRATION RATE: 18 BRPM | TEMPERATURE: 50 F | HEART RATE: 75 BPM

## 2023-10-30 DIAGNOSIS — I89.0 LYMPHEDEMA DUE TO CHRONIC INFLAMMATION: ICD-10-CM

## 2023-10-30 DIAGNOSIS — I87.2 CHRONIC VENOUS STASIS DERMATITIS OF BOTH LOWER EXTREMITIES: ICD-10-CM

## 2023-10-30 DIAGNOSIS — L89.213 PRESSURE ULCER OF UPPER THIGH, RIGHT, STAGE III: Primary | ICD-10-CM

## 2023-10-30 PROCEDURE — 99499 NO LOS: ICD-10-PCS | Mod: ,,, | Performed by: SURGERY

## 2023-10-30 PROCEDURE — 99499 UNLISTED E&M SERVICE: CPT | Mod: ,,, | Performed by: SURGERY

## 2023-10-30 PROCEDURE — 11042 DBRDMT SUBQ TIS 1ST 20SQCM/<: CPT

## 2023-10-30 NOTE — ASSESSMENT & PLAN NOTE
Lower extremity edema remains well controlled with compression.  Patient has been compliant with juxta Lites.  Wound is decreased in size.  Wound remains clean.  Continue sharp debridement is needed.  Continue silver dressing

## 2023-10-30 NOTE — PROGRESS NOTES
[facility]  Wound Care  Progress Note    Problem List Items Addressed This Visit       Pressure ulcer of upper thigh, right, stage III    Overview     Patient informed us today of a chronic wound on the right posterior thigh.  Patient states that it has been present for several months.  She is had no real wound care to this area.  Patient says that wound occurred after sitting in a hard metal chair.  Patient denies any significant pain.  Patient denies any fever chills or significant drainage.  As of 6/26/2023 thigh wound is healed.         Current Assessment & Plan     Lower extremity edema remains well controlled with compression.  Patient has been compliant with juxta Lites.  Wound is decreased in size.  Wound remains clean.  Continue sharp debridement is needed.  Continue silver dressing            See wound doc progress notes. Documents will be scanned.        Alcides Kramer  [facility]

## 2023-11-20 ENCOUNTER — OFFICE VISIT (OUTPATIENT)
Dept: WOUND CARE | Facility: HOSPITAL | Age: 73
End: 2023-11-20
Attending: SURGERY
Payer: COMMERCIAL

## 2023-11-20 VITALS
HEART RATE: 78 BPM | RESPIRATION RATE: 19 BRPM | TEMPERATURE: 99 F | SYSTOLIC BLOOD PRESSURE: 126 MMHG | DIASTOLIC BLOOD PRESSURE: 68 MMHG

## 2023-11-20 DIAGNOSIS — L89.213 PRESSURE ULCER OF UPPER THIGH, RIGHT, STAGE III: Primary | ICD-10-CM

## 2023-11-20 PROCEDURE — 11042 DBRDMT SUBQ TIS 1ST 20SQCM/<: CPT

## 2023-11-20 PROCEDURE — 99499 UNLISTED E&M SERVICE: CPT | Mod: ,,, | Performed by: SURGERY

## 2023-11-20 PROCEDURE — 99499 NO LOS: ICD-10-PCS | Mod: ,,, | Performed by: SURGERY

## 2023-11-20 NOTE — ASSESSMENT & PLAN NOTE
Wound is slow to heal.  I suspect patient is sliding on the wound limiting offloading.    Continue debridement to remove nonviable tissue and maintain active phase wound healing.  Offloading

## 2023-11-20 NOTE — PROGRESS NOTES
[facility]  Wound Care  Progress Note    Problem List Items Addressed This Visit          Orthopedic    Pressure ulcer of upper thigh, right, stage III - Primary    Overview     Patient informed us today of a chronic wound on the right posterior thigh.  Patient states that it has been present for several months.  She is had no real wound care to this area.  Patient says that wound occurred after sitting in a hard metal chair.  Patient denies any significant pain.  Patient denies any fever chills or significant drainage.  As of 6/26/2023 thigh wound is healed.         Current Assessment & Plan     Wound is slow to heal.  I suspect patient is sliding on the wound limiting offloading.    Continue debridement to remove nonviable tissue and maintain active phase wound healing.  Offloading            See wound doc progress notes. Documents will be scanned.        Kevin Strong  [facility]

## 2023-12-04 ENCOUNTER — OFFICE VISIT (OUTPATIENT)
Dept: WOUND CARE | Facility: HOSPITAL | Age: 73
End: 2023-12-04
Attending: SURGERY
Payer: COMMERCIAL

## 2023-12-04 VITALS
SYSTOLIC BLOOD PRESSURE: 127 MMHG | HEART RATE: 79 BPM | RESPIRATION RATE: 18 BRPM | DIASTOLIC BLOOD PRESSURE: 67 MMHG | TEMPERATURE: 98 F

## 2023-12-04 DIAGNOSIS — L89.213 PRESSURE ULCER OF UPPER THIGH, RIGHT, STAGE III: Primary | ICD-10-CM

## 2023-12-04 PROCEDURE — 99499 NO LOS: ICD-10-PCS | Mod: ,,, | Performed by: SURGERY

## 2023-12-04 PROCEDURE — 11042 DBRDMT SUBQ TIS 1ST 20SQCM/<: CPT

## 2023-12-04 PROCEDURE — 99499 UNLISTED E&M SERVICE: CPT | Mod: ,,, | Performed by: SURGERY

## 2023-12-04 NOTE — PROGRESS NOTES
[facility]  Wound Care  Progress Note    Problem List Items Addressed This Visit          Orthopedic    Pressure ulcer of upper thigh, right, stage III - Primary    Overview     Patient informed us today of a chronic wound on the right posterior thigh.  Patient states that it has been present for several months.  She is had no real wound care to this area.  Patient says that wound occurred after sitting in a hard metal chair.  Patient denies any significant pain.  Patient denies any fever chills or significant drainage.  As of 6/26/2023 thigh wound is healed.         Current Assessment & Plan     Wound is improving slowly.  Continue debridement to maintain active phase wound healing and remove nonviable slough.  Continue offloading.            See wound doc progress notes. Documents will be scanned.        Kevin Strong  [facility]

## 2023-12-04 NOTE — ASSESSMENT & PLAN NOTE
Wound is improving slowly.  Continue debridement to maintain active phase wound healing and remove nonviable slough.  Continue offloading.

## 2023-12-18 ENCOUNTER — OFFICE VISIT (OUTPATIENT)
Dept: WOUND CARE | Facility: HOSPITAL | Age: 73
End: 2023-12-18
Attending: SURGERY
Payer: COMMERCIAL

## 2023-12-18 VITALS
RESPIRATION RATE: 18 BRPM | DIASTOLIC BLOOD PRESSURE: 67 MMHG | TEMPERATURE: 99 F | SYSTOLIC BLOOD PRESSURE: 124 MMHG | HEART RATE: 77 BPM

## 2023-12-18 DIAGNOSIS — L89.213 PRESSURE ULCER OF UPPER THIGH, RIGHT, STAGE III: Primary | ICD-10-CM

## 2023-12-18 PROCEDURE — 99499 NO LOS: ICD-10-PCS | Mod: ,,, | Performed by: SURGERY

## 2023-12-18 PROCEDURE — 99499 UNLISTED E&M SERVICE: CPT | Mod: ,,, | Performed by: SURGERY

## 2023-12-18 PROCEDURE — 11042 DBRDMT SUBQ TIS 1ST 20SQCM/<: CPT

## 2023-12-18 NOTE — PROGRESS NOTES
[facility]  Wound Care  Progress Note    Problem List Items Addressed This Visit          Orthopedic    Pressure ulcer of upper thigh, right, stage III - Primary    Overview     Patient informed us today of a chronic wound on the right posterior thigh.  Patient states that it has been present for several months.  She is had no real wound care to this area.  Patient says that wound occurred after sitting in a hard metal chair.  Patient denies any significant pain.  Patient denies any fever chills or significant drainage.  As of 6/26/2023 thigh wound is healed.         Current Assessment & Plan     Wound is stable.    Continue debridement to maintain active phase wound healing.    Continue offloading to minimize injury.            See wound doc progress notes. Documents will be scanned.        Kevin Strong  [facility]

## 2023-12-18 NOTE — ASSESSMENT & PLAN NOTE
Wound is stable.    Continue debridement to maintain active phase wound healing.    Continue offloading to minimize injury.

## 2024-12-12 ENCOUNTER — OFFICE VISIT (OUTPATIENT)
Dept: WOUND CARE | Facility: HOSPITAL | Age: 74
End: 2024-12-12
Attending: SURGERY
Payer: COMMERCIAL

## 2024-12-12 VITALS — HEART RATE: 91 BPM | DIASTOLIC BLOOD PRESSURE: 77 MMHG | SYSTOLIC BLOOD PRESSURE: 140 MMHG

## 2024-12-12 DIAGNOSIS — I87.333 CHRONIC VENOUS HYPERTENSION WITH ULCER AND INFLAMMATION INVOLVING BOTH SIDES: Primary | ICD-10-CM

## 2024-12-12 DIAGNOSIS — L97.812 NON-PRESSURE CHRONIC ULCER OF OTHER PART OF RIGHT LOWER LEG WITH FAT LAYER EXPOSED: ICD-10-CM

## 2024-12-12 DIAGNOSIS — L97.919 IDIOPATHIC CHRONIC VENOUS HYPERTENSION OF RIGHT LOWER EXTREMITY WITH ULCER: ICD-10-CM

## 2024-12-12 DIAGNOSIS — I87.311 IDIOPATHIC CHRONIC VENOUS HYPERTENSION OF RIGHT LOWER EXTREMITY WITH ULCER: ICD-10-CM

## 2024-12-12 PROCEDURE — 99499 UNLISTED E&M SERVICE: CPT | Mod: ,,, | Performed by: SURGERY

## 2024-12-12 PROCEDURE — 99215 OFFICE O/P EST HI 40 MIN: CPT

## 2024-12-12 PROCEDURE — 11042 DBRDMT SUBQ TIS 1ST 20SQCM/<: CPT

## 2024-12-12 NOTE — PROGRESS NOTES
[facility]  Wound Care  Progress Note    Problem List Items Addressed This Visit       Chronic venous hypertension with ulcer and inflammation involving both sides - Primary    Overview     74-year-old diabetic Patient with long history of chronic venous disease and lower extremity swelling.  Patient does wear her juxta Lite for compression but broke out in a blister to the right lower shin area and now has a small ulcers.  They are superficial with some slough and granulation.  Sharp debridement with curette was performed today.  Silver alginate will be applied as well as continued compression.            See wound doc progress notes. Documents will be scanned.        Samy Sinclair Jr  [facility]

## 2024-12-30 ENCOUNTER — OFFICE VISIT (OUTPATIENT)
Dept: WOUND CARE | Facility: HOSPITAL | Age: 74
End: 2024-12-30
Attending: SURGERY
Payer: COMMERCIAL

## 2024-12-30 VITALS — SYSTOLIC BLOOD PRESSURE: 132 MMHG | HEART RATE: 75 BPM | DIASTOLIC BLOOD PRESSURE: 75 MMHG

## 2024-12-30 DIAGNOSIS — I87.311 IDIOPATHIC CHRONIC VENOUS HYPERTENSION OF RIGHT LOWER EXTREMITY WITH ULCER: ICD-10-CM

## 2024-12-30 DIAGNOSIS — L97.812 NON-PRESSURE CHRONIC ULCER OF OTHER PART OF RIGHT LOWER LEG WITH FAT LAYER EXPOSED: Primary | ICD-10-CM

## 2024-12-30 DIAGNOSIS — I87.333 CHRONIC VENOUS HYPERTENSION WITH ULCER AND INFLAMMATION INVOLVING BOTH SIDES: ICD-10-CM

## 2024-12-30 DIAGNOSIS — L97.919 IDIOPATHIC CHRONIC VENOUS HYPERTENSION OF RIGHT LOWER EXTREMITY WITH ULCER: ICD-10-CM

## 2024-12-30 PROCEDURE — 99213 OFFICE O/P EST LOW 20 MIN: CPT

## 2024-12-30 PROCEDURE — 99499 UNLISTED E&M SERVICE: CPT | Mod: ,,, | Performed by: SURGERY

## 2024-12-30 NOTE — PROGRESS NOTES
[facility]  Wound Care  Progress Note    Problem List Items Addressed This Visit       Chronic venous hypertension with ulcer and inflammation involving both sides    Overview     74-year-old diabetic Patient with long history of chronic venous disease and lower extremity swelling.  Patient does wear her juxta Lite for compression but broke out in a blister to the right lower shin area and now has a small ulcers.  They are superficial with some slough and granulation.  Sharp debridement with curette was performed today.  Silver alginate will be applied as well as continued compression.         Current Assessment & Plan     Wound remains resolved.  Continue compression.          Other Visit Diagnoses       Non-pressure chronic ulcer of other part of right lower leg with fat layer exposed    -  Primary    Idiopathic chronic venous hypertension of right lower extremity with ulcer                See wound doc progress notes. Documents will be scanned.        Samy Sinclair Jr  [facility]